# Patient Record
Sex: MALE | Race: WHITE | Employment: UNEMPLOYED | URBAN - METROPOLITAN AREA
[De-identification: names, ages, dates, MRNs, and addresses within clinical notes are randomized per-mention and may not be internally consistent; named-entity substitution may affect disease eponyms.]

---

## 2017-05-21 ENCOUNTER — HOSPITAL ENCOUNTER (EMERGENCY)
Age: 62
Discharge: HOME | End: 2017-05-21
Attending: EMERGENCY MEDICINE
Payer: MEDICARE

## 2017-05-21 VITALS
HEIGHT: 68 IN | TEMPERATURE: 97.9 F | DIASTOLIC BLOOD PRESSURE: 67 MMHG | WEIGHT: 220 LBS | SYSTOLIC BLOOD PRESSURE: 123 MMHG | OXYGEN SATURATION: 100 % | RESPIRATION RATE: 18 BRPM | HEART RATE: 68 BPM | BODY MASS INDEX: 33.34 KG/M2

## 2017-05-21 DIAGNOSIS — F20.3 UNDIFFERENTIATED SCHIZOPHRENIA (HCC): Primary | ICD-10-CM

## 2017-05-21 DIAGNOSIS — B88.8 INFESTATION BY BED BUG: ICD-10-CM

## 2017-05-21 LAB
ALBUMIN SERPL BCP-MCNC: 2.9 G/DL (ref 3.5–5)
ALBUMIN/GLOB SERPL: 0.8 {RATIO} (ref 1.1–2.2)
ALP SERPL-CCNC: 57 U/L (ref 45–117)
ALT SERPL-CCNC: 18 U/L (ref 12–78)
ANION GAP BLD CALC-SCNC: 8 MMOL/L (ref 5–15)
APAP SERPL-MCNC: 2 UG/ML (ref 10–30)
AST SERPL W P-5'-P-CCNC: 15 U/L (ref 15–37)
BASOPHILS # BLD AUTO: 0 K/UL (ref 0–0.1)
BASOPHILS # BLD: 0 % (ref 0–1)
BILIRUB SERPL-MCNC: 0.8 MG/DL (ref 0.2–1)
BUN SERPL-MCNC: 15 MG/DL (ref 6–20)
BUN/CREAT SERPL: 15 (ref 12–20)
CALCIUM SERPL-MCNC: 9.1 MG/DL (ref 8.5–10.1)
CHLORIDE SERPL-SCNC: 102 MMOL/L (ref 97–108)
CO2 SERPL-SCNC: 25 MMOL/L (ref 21–32)
CREAT SERPL-MCNC: 1.03 MG/DL (ref 0.7–1.3)
EOSINOPHIL # BLD: 0.4 K/UL (ref 0–0.4)
EOSINOPHIL NFR BLD: 6 % (ref 0–7)
ERYTHROCYTE [DISTWIDTH] IN BLOOD BY AUTOMATED COUNT: 13.7 % (ref 11.5–14.5)
ETHANOL SERPL-MCNC: <10 MG/DL
GLOBULIN SER CALC-MCNC: 3.5 G/DL (ref 2–4)
GLUCOSE SERPL-MCNC: 73 MG/DL (ref 65–100)
HCT VFR BLD AUTO: 38.9 % (ref 36.6–50.3)
HGB BLD-MCNC: 13.6 G/DL (ref 12.1–17)
LYMPHOCYTES # BLD AUTO: 34 % (ref 12–49)
LYMPHOCYTES # BLD: 2.3 K/UL (ref 0.8–3.5)
MCH RBC QN AUTO: 31.9 PG (ref 26–34)
MCHC RBC AUTO-ENTMCNC: 35 G/DL (ref 30–36.5)
MCV RBC AUTO: 91.3 FL (ref 80–99)
MONOCYTES # BLD: 0.6 K/UL (ref 0–1)
MONOCYTES NFR BLD AUTO: 9 % (ref 5–13)
NEUTS SEG # BLD: 3.4 K/UL (ref 1.8–8)
NEUTS SEG NFR BLD AUTO: 51 % (ref 32–75)
PLATELET # BLD AUTO: 132 K/UL (ref 150–400)
POTASSIUM SERPL-SCNC: 4.1 MMOL/L (ref 3.5–5.1)
PROT SERPL-MCNC: 6.4 G/DL (ref 6.4–8.2)
RBC # BLD AUTO: 4.26 M/UL (ref 4.1–5.7)
SALICYLATES SERPL-MCNC: <1.7 MG/DL (ref 2.8–20)
SODIUM SERPL-SCNC: 135 MMOL/L (ref 136–145)
WBC # BLD AUTO: 6.7 K/UL (ref 4.1–11.1)

## 2017-05-21 PROCEDURE — 80307 DRUG TEST PRSMV CHEM ANLYZR: CPT | Performed by: EMERGENCY MEDICINE

## 2017-05-21 PROCEDURE — 90791 PSYCH DIAGNOSTIC EVALUATION: CPT

## 2017-05-21 PROCEDURE — 36415 COLL VENOUS BLD VENIPUNCTURE: CPT | Performed by: EMERGENCY MEDICINE

## 2017-05-21 PROCEDURE — 85025 COMPLETE CBC W/AUTO DIFF WBC: CPT | Performed by: EMERGENCY MEDICINE

## 2017-05-21 PROCEDURE — 80053 COMPREHEN METABOLIC PANEL: CPT | Performed by: EMERGENCY MEDICINE

## 2017-05-21 PROCEDURE — 99285 EMERGENCY DEPT VISIT HI MDM: CPT

## 2017-05-21 RX ORDER — RISPERIDONE 1 MG/1
1 TABLET, ORALLY DISINTEGRATING ORAL 2 TIMES DAILY
Qty: 60 TAB | Refills: 0 | Status: ON HOLD | OUTPATIENT
Start: 2017-05-21 | End: 2017-06-29

## 2017-05-21 NOTE — ED NOTES
I have reviewed discharge instructions with the patient. The patient verbalized understanding. Provided a wheelchair on discharge. [employer] cab to home.

## 2017-05-21 NOTE — BSMART NOTE
Comprehensive Assessment Form Part 1      Section I - Disposition    Axis I - Schizoaffective  Axis II - None  Axis III -   Past Medical History:   Diagnosis Date    Hypertension     Ill-defined condition     high cholesterol    Psychiatric disorder     depression       Axis IV - None reported  [place] V - 40      The Medical Doctor to Psychiatrist conference was not completed. The Medical Doctor is in agreement with Psychiatrist disposition because of (reason) Patient does not require admission at this time. The plan is discharge to [facility]. The on-call Psychiatrist consulted was Dr. Catrina Hodgkin. The admitting Psychiatrist will be Dr. Christian Ely. The admitting Diagnosis is NA. The Payor source is Medicare and Medicaid. Section II - Integrated Summary  Summary:  Patient came in from [facility] due to a change in hallucinations. Patient hears voices at baseline but per patient 6-7 weeks ago they started kissing him all over his body. Patient reported today [facility] sent him here because he wasn't acting like himself. Patient reported he was yelling and screaming at the voices. Patient reported his mood has been \"pretty good\" and he denied appetite or sleep disturbances. Patient denied SI and HI as well. Patient reported medication compliance. Per patient he has not recently been admitted and 2014 was last admission. Patient indicated he doesn't know his psychiatrist's name and has a  named Aubree Farr but isn't sure where he works. Spoke with [facility] and they reported he was \"fussing with someone at the table, like the voices\" and the staff member had never seen that before so they sent him in. Patient reportedly sees Bonifacio Ahumada, NP and Aubree Farr at [address] is his . Current medications are Depakote 1500 mg at bed, Hydroxyzine 50 mg at bed, and Gabapentin 300 mg at bed.   Day medications are reportedly Vitamin D3, Lisinopril, Simvastatin, and Montelukast.   The patienthas not demonstrated mental capacity to provide informed consent. The information is given by the patient and past medical records. The Chief Complaint is hallucinations. The Precipitant Factors are wasn't acting himself per patient. Previous Hospitalizations: Yes  Current Psychiatrist and/or  is Patient doesn't know his psychiatrist name and reported Cosme Kaiser is his  but doesn't know where he works. .    Lethality Assessment:    The potential for suicide noted by the following: Patient denied SI. The potential for homicide is not noted. The patient has not been a perpetrator of sexual or physical abuse. There are not pending charges. The patient is not felt to be at risk for self harm or harm to others. The attending nurse was advised that security has not been notified. Section III - Psychosocial  The patient's overall mood and attitude is \"pretty good\". Feelings of helplessness and hopelessness are not observed. Generalized anxiety is not observed. Panic is not observed. Phobias are not observed. Obsessive compulsive tendencies are not observed. Section IV - Mental Status Exam  The patient's appearance shows no evidence of impairment. The patient's behavior shows no evidence of impairment. The patient is oriented to time, place, person and situation. The patient's speech shows no evidence of impairment. The patient's mood is euthymic. The range of affect shows no evidence of impairment. The patient's thought content demonstrates no evidence of impairment. The thought process shows no evidence of impairment. The patient's perception demonstrated changes in the following:  auditory  tactile. The patient's memory shows no evidence of impairment. The patient's appetite shows no evidence of impairment. The patient's sleep shows no evidence of impairment. The patient shows no insight. The patient's judgement is psychologically impaired.                   Section V - Substance Abuse  The patient is not using substances. Section VI - Living Arrangements  The patient is single. The patient lives with a caregiver. The patient has no children. The patient does plan to return home upon discharge. The patient does not have legal issues pending. The patient's source of income comes from disability. Jehovah's witness and cultural practices have not been voiced at this time. The patient's greatest support comes from [facility] and this person will be involved with the treatment. The patient has not been in an event described as horrible or outside the realm of ordinary life experience either currently or in the past.  The patient has not been a victim of sexual/physical abuse. Section VII - Other Areas of Clinical Concern  The highest grade achieved is NA with the overall quality of school experience being described as NA. The patient is currently disabled and speaks [place] as a primary language. The patient has no communication impairments affecting communication. The patient's preference for learning can be described as: can read and write adequately.   The patient's hearing is normal.  The patient's vision is normal.      Bryon Pearson, LANA

## 2017-05-21 NOTE — ED NOTES
[employer] called for transportation back to [facility]. Pt given written/verbal DC instructions by Dr. Butch Dick. Pt verbalized understanding.

## 2017-05-21 NOTE — ED NOTES
Spoke with Prerna Means from [address] [place] [address] regarding bed bugs found on Pt. Ok to Discharge pt. Awaiting Cab from [address] .

## 2017-05-21 NOTE — ED PROVIDER NOTES
HPI Comments: Merna Yin is a 62 y.o. male with PMhx significant for schizophrenia and depression who presents via EMS from [facility] to the ED with cc of auditory hallucinations x 6 weeks. He states the voices are not saying anything to him, but are kissing his legs, stomach, back and neck. EMS reports they were called by pt's sister, POA, for evaluation. He endorses taking his daily medications as instructed and has not had any recent changes in medications. Pt reports he sees a psychiatrist regularly, but notes he is unsure of her name. Pt specifically denies any suicidal ideations, visual hallucinations, cough, rhinorrhea, sore throat, abdominal pain, chest pain, and SOB. PCP: Osei Contreras MD    There are no other complaints, changes or physical findings at this time. The history is provided by the patient and the EMS personnel. Past Medical History:   Diagnosis Date    Hypertension     Ill-defined condition     high cholesterol    Psychiatric disorder     depression       No past surgical history on file. No family history on file. Social History     Social History    Marital status: SINGLE     Spouse name: N/A    Number of children: N/A    Years of education: N/A     Occupational History    Not on file. Social History Main Topics    Smoking status: Current Every Day Smoker     Packs/day: 0.25    Smokeless tobacco: Not on file    Alcohol use No    Drug use: No    Sexual activity: Not on file     Other Topics Concern    Not on file     Social History Narrative         ALLERGIES: Other medication    Review of Systems   Constitutional: Negative for chills, diaphoresis, fever and unexpected weight change. HENT: Negative for rhinorrhea and sore throat. Eyes: Negative for pain. Respiratory: Negative for cough, shortness of breath, wheezing and stridor. Cardiovascular: Negative for chest pain and leg swelling.    Gastrointestinal: Negative for abdominal pain, blood in stool, diarrhea, nausea and vomiting. Genitourinary: Negative for difficulty urinating, dysuria and flank pain. Musculoskeletal: Negative for back pain and neck stiffness. Skin: Negative for rash. Neurological: Negative for seizures, syncope, weakness, light-headedness and headaches. Psychiatric/Behavioral: Negative for confusion and suicidal ideas. + auditory hallucinations  - visual hallucinations       Patient Vitals for the past 12 hrs:   Temp Pulse Resp BP SpO2   05/21/17 1610 - 68 18 123/67 100 %   05/21/17 1445 - 73 18 113/67 100 %   05/21/17 1400 - 70 - 106/63 100 %   05/21/17 1246 - - - 100/66 98 %   05/21/17 1222 97.9 °F (36.6 °C) (!) 57 16 100/66 98 %       Physical Exam   Constitutional: He is oriented to person, place, and time. He appears well-developed and well-nourished. Disheveled   HENT:   Nose: Nose normal.   Mouth/Throat: Oropharynx is clear and moist. No oropharyngeal exudate. Poor dentition   Eyes: Conjunctivae and EOM are normal. Pupils are equal, round, and reactive to light. Right eye exhibits no discharge. Left eye exhibits no discharge. No scleral icterus. Neck: Normal range of motion. Neck supple. No JVD present. Cardiovascular: Normal rate, regular rhythm, normal heart sounds and intact distal pulses. No murmur heard. Pulmonary/Chest: Effort normal and breath sounds normal. No stridor. No respiratory distress. He has no wheezes. He has no rales. Abdominal: Soft. Bowel sounds are normal. He exhibits no distension. There is no tenderness. There is no rebound and no guarding. Musculoskeletal: He exhibits no edema or tenderness. Neurological: He is alert and oriented to person, place, and time. Skin: Skin is warm and dry. He is not diaphoretic. Excoriations covering bilateral lower extremities   Psychiatric: His speech is normal. His affect is blunt. He is actively hallucinating (auditory).  Thought content is delusional. He expresses no suicidal plans. Nursing note and vitals reviewed. 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):   Diagnosis management comments: Schizophrenic pt with hallucinations of voices \"tickling and kissing\" him. Pt incidentally found to have bed bugs. Query whether these are the cause of pt's hallucinations. For some reason, pt found not to be on antipsychotics at this time. Psychiatry consulted and recommended restarting Risperidone. Stable for discharge. Amount and/or Complexity of Data Reviewed  Clinical lab tests: ordered and reviewed  Obtain history from someone other than the patient: yes (EMS)  Review and summarize past medical records: yes  Discuss the patient with other providers: yes ([address]  Psychiatry)    Patient Progress  Patient progress: stable    ED Course       Procedures    CONSULT NOTE:   12:41 PM  Jazzy Young MD spoke with Joe Meyer,   Specialty: [address]  Discussed pt's hx, disposition, and available diagnostic and imaging results. Reviewed care plans. Consultant agrees with plans as outlined. Joe Meyer will come to evaluate pt. Written by Rob Antonio ED Scribe, as dictated by Jazzy Young MD.    CONSULT NOTE:   2:47 PM  Jazzy Young MD spoke with Porsche Enriquez MD,   Specialty: psychiatry  Discussed pt's hx, disposition, and available diagnostic and imaging results. Reviewed care plans. Consultant agrees with plans as outlined. Dr. Ana María Dunbar recommended pt be started on Risperidone 1mg BID and to follow up closely with his mental health provider.    Written by Rob Antonio ED Scribe, as dictated by Jazzy Young MD.    LABORATORY TESTS:  Recent Results (from the past 12 hour(s))   CBC WITH AUTOMATED DIFF    Collection Time: 05/21/17  1:37 PM   Result Value Ref Range    WBC 6.7 4.1 - 11.1 K/uL    RBC 4.26 4.10 - 5.70 M/uL    HGB 13.6 12.1 - 17.0 g/dL    HCT 38.9 36.6 - 50.3 %    MCV 91.3 80.0 - 99.0 FL    MCH 31.9 26.0 - 34.0 PG    MCHC 35.0 30.0 - 36.5 g/dL    RDW 13.7 11.5 - 14.5 %    PLATELET 223 (L) 349 - 400 K/uL    NEUTROPHILS 51 32 - 75 %    LYMPHOCYTES 34 12 - 49 %    MONOCYTES 9 5 - 13 %    EOSINOPHILS 6 0 - 7 %    BASOPHILS 0 0 - 1 %    ABS. NEUTROPHILS 3.4 1.8 - 8.0 K/UL    ABS. LYMPHOCYTES 2.3 0.8 - 3.5 K/UL    ABS. MONOCYTES 0.6 0.0 - 1.0 K/UL    ABS. EOSINOPHILS 0.4 0.0 - 0.4 K/UL    ABS. BASOPHILS 0.0 0.0 - 0.1 K/UL   METABOLIC PANEL, COMPREHENSIVE    Collection Time: 05/21/17  1:37 PM   Result Value Ref Range    Sodium 135 (L) 136 - 145 mmol/L    Potassium 4.1 3.5 - 5.1 mmol/L    Chloride 102 97 - 108 mmol/L    CO2 25 21 - 32 mmol/L    Anion gap 8 5 - 15 mmol/L    Glucose 73 65 - 100 mg/dL    BUN 15 6 - 20 MG/DL    Creatinine 1.03 0.70 - 1.30 MG/DL    BUN/Creatinine ratio 15 12 - 20      GFR est AA >60 >60 ml/min/1.73m2    GFR est non-AA >60 >60 ml/min/1.73m2    Calcium 9.1 8.5 - 10.1 MG/DL    Bilirubin, total 0.8 0.2 - 1.0 MG/DL    ALT (SGPT) 18 12 - 78 U/L    AST (SGOT) 15 15 - 37 U/L    Alk. phosphatase 57 45 - 117 U/L    Protein, total 6.4 6.4 - 8.2 g/dL    Albumin 2.9 (L) 3.5 - 5.0 g/dL    Globulin 3.5 2.0 - 4.0 g/dL    A-G Ratio 0.8 (L) 1.1 - 2.2     ETHYL ALCOHOL    Collection Time: 05/21/17  1:37 PM   Result Value Ref Range    ALCOHOL(ETHYL),SERUM <10 <10 MG/DL   ACETAMINOPHEN    Collection Time: 05/21/17  1:37 PM   Result Value Ref Range    ACETAMINOPHEN 2 (L) 10 - 30 ug/mL   SALICYLATE    Collection Time: 05/21/17  1:37 PM   Result Value Ref Range    SALICYLATE <1.6 (L) 2.8 - 20.0 MG/DL       IMPRESSION:  1. Undifferentiated schizophrenia ([address]    2. Infestation by bed bug        PLAN:  1. Discharge home  Current Discharge Medication List      START taking these medications    Details   risperiDONE (RISPERDAL M-TABS) 1 mg disintegrating tablet Take 1 Tab by mouth two (2) times a day.   Qty: 60 Tab, Refills: 0         CONTINUE these medications which have NOT CHANGED    Details divalproex DR (DEPAKOTE) 500 mg tablet Take 1,500 mg by mouth nightly. triamcinolone acetonide (KENALOG) 0.1 % topical cream Apply  to affected area two (2) times a day. use thin layer  Qty: 15 g, Refills: 0      lisinopril (PRINIVIL, ZESTRIL) 10 mg tablet Take 10 mg by mouth daily. amLODIPine (NORVASC) 5 mg tablet Take 5 mg by mouth daily. simvastatin (ZOCOR) 10 mg tablet Take 10 mg by mouth nightly.      gabapentin (NEURONTIN) 300 mg capsule Take 300 mg by mouth three (3) times daily. risperiDONE (RISPERDAL) 1 mg tablet Take 1 mg by mouth nightly. permethrin (ACTICIN) 5 % topical cream Apply cream from head (avoid mouth/nose) to soles of feet and wash after 8-14 hours. May repeat therapy in 7 days. Qty: 60 g, Refills: 1           2. Follow-up Information     Follow up With Details Comments Contact Info    Primary Care Doctor Call As needed     [place] Provider Call in 1 day          Return to ED if worse     DISCHARGE NOTE:  2:53 PM  The patient is ready for discharge. The patients signs, symptoms, diagnosis, and instructions for discharge have been discussed and the pt has conveyed their understanding. The patient is to follow up as recommended with PCP or return to the ER should their symptoms worsen. Plan has been discussed and patient has conveyed their agreement. This note is prepared by Darrian Ron, acting as Scribe for Gerhardt Fang, MD.    Gerhardt Fang, MD: The scribe's documentation has been prepared under my direction and personally reviewed by me in its entirety. I confirm that the note above accurately reflects all work, treatment, procedures, and medical decision making performed by me.

## 2017-05-21 NOTE — DISCHARGE INSTRUCTIONS
Schizophrenia: Care Instructions  Your Care Instructions  Schizophrenia is a disease that makes it hard to think clearly, manage emotions, and interact with other people. It can cause:  · Delusions. These are beliefs that are not real.  · Hallucinations. These are things that you may see or hear that are not really there. · Paranoia. This is the belief that others are lying, cheating, using you, or trying to harm you. The disease may change your ability to enjoy life, express emotions, or function. At times, you may hear voices, behave strangely, have trouble speaking or understanding speech, or keep to yourself. The goal of treatment is to lower your stress and help your brain function normally. You may need lifelong treatment with medicines and counseling to keep your schizophrenia under control. When schizophrenia is not treated, the risks are higher for suicide, a hospital stay, and other problems. Early treatment called coordinated specialty care [facility]) may help a person who is having his or her first episode of psychotic thoughts. Ask your doctor about [address]. Follow-up care is a key part of your treatment and safety. Be sure to make and go to all appointments, and call your doctor if you are having problems. It's also a good idea to know your test results and keep a list of the medicines you take. How can you care for yourself at home? · Be safe with medicines. Take your medicines exactly as prescribed. Call your doctor if you think you are having a problem with your medicine. When you feel good, you may think that you do not need your medicines. But it is important to keep taking them as scheduled. · Go to your counseling sessions. Call and talk with your counselor if you can't attend or if you don't think the sessions are helping. Do not just stop going. · Eat a healthy diet. Talk with a dietitian about what type of diet may be best for you. · Do not use alcohol or illegal drugs.   What should you do if someone in your family has schizophrenia? · Learn about the disease and how it may get worse over time. · Remind your family member that you love him or her. · Make a plan with all family members about how to take care of your loved one when his or her symptoms are bad. · Talk about your fears and concerns and those of other family members. Seek counseling if needed. · Do not focus attention only on the person who is in treatment. · Remind yourself that it will take time for changes to occur. · Do not blame yourself for the disease. · Know your legal rights and the legal rights of your family member. · Take care of yourself. Stay involved with your own interests, such as your career, hobbies, and friends. Use exercise, positive self-talk, relaxation, and deep breathing to help lower your stress. · Give yourself time to grieve. You may need to deal with emotions such as anger, fear, and frustration. After you work through your feelings, you will be better able to care for yourself and your family. · If you are having a hard time with your feelings and your interactions with your family member, talk with a counselor. When should you call for help? Call 911 anytime you think you may need emergency care. For example, call if:  · You are thinking about suicide or are threatening suicide. · You feel like hurting yourself or someone else. Call your doctor now or seek immediate medical care if:  · You hear voices. · You think someone is trying to harm you. · You cannot concentrate or are easily confused. Watch closely for changes in your health, and be sure to contact your doctor if:  · You are having trouble taking care of yourself. · You cannot attend your counseling sessions. Where can you learn more? Go to http://yonathan-roger.info/. Enter H395 in the search box to learn more about \"Schizophrenia: Care Instructions. \"  Current as of: July 26, 2016  Content Version: 11.2  © 3979-9861 [employer]. Care instructions adapted under license by [employer] (which disclaims liability or warranty for this information). If you have questions about a medical condition or this instruction, always ask your healthcare professional. [place] [address] any warranty or liability for your use of this information.

## 2017-05-21 NOTE — ED TRIAGE NOTES
Pt reports he hears voices and lately they have been \"tickling or kissing him on his feet and legs. \" EMS was called by POA pt's sister. Denies pain and discomfort, denies SOB no signs of acute distress. Denies thoughts of suicide, and denies thoughts of hurting or harming him self or others.

## 2017-05-21 NOTE — ED NOTES
obtained blood work found two bed bugs on pt's pants. Removed and double bagged clothing, CHG wiped pt from head to toe and provided education. Unable to provide urine sample at this time.

## 2017-06-10 ENCOUNTER — HOSPITAL ENCOUNTER (EMERGENCY)
Age: 62
Discharge: HOME | End: 2017-06-10
Attending: EMERGENCY MEDICINE
Payer: MEDICARE

## 2017-06-10 ENCOUNTER — APPOINTMENT (OUTPATIENT)
Dept: CT IMAGING | Age: 62
End: 2017-06-10
Attending: EMERGENCY MEDICINE
Payer: MEDICARE

## 2017-06-10 VITALS
BODY MASS INDEX: 33.13 KG/M2 | OXYGEN SATURATION: 100 % | HEIGHT: 68 IN | SYSTOLIC BLOOD PRESSURE: 105 MMHG | DIASTOLIC BLOOD PRESSURE: 60 MMHG | WEIGHT: 218.6 LBS | HEART RATE: 68 BPM | TEMPERATURE: 97.7 F | RESPIRATION RATE: 16 BRPM

## 2017-06-10 DIAGNOSIS — F20.9 SCHIZOPHRENIA, UNSPECIFIED TYPE (HCC): ICD-10-CM

## 2017-06-10 DIAGNOSIS — S09.90XA CHI (CLOSED HEAD INJURY), INITIAL ENCOUNTER: Primary | ICD-10-CM

## 2017-06-10 LAB
ALBUMIN SERPL BCP-MCNC: 2.8 G/DL (ref 3.5–5)
ALBUMIN/GLOB SERPL: 0.8 {RATIO} (ref 1.1–2.2)
ALP SERPL-CCNC: 55 U/L (ref 45–117)
ALT SERPL-CCNC: 17 U/L (ref 12–78)
ANION GAP BLD CALC-SCNC: 5 MMOL/L (ref 5–15)
AST SERPL W P-5'-P-CCNC: 14 U/L (ref 15–37)
BASOPHILS # BLD AUTO: 0 K/UL (ref 0–0.1)
BASOPHILS # BLD: 0 % (ref 0–1)
BILIRUB SERPL-MCNC: 0.5 MG/DL (ref 0.2–1)
BUN SERPL-MCNC: 11 MG/DL (ref 6–20)
BUN/CREAT SERPL: 12 (ref 12–20)
CALCIUM SERPL-MCNC: 9 MG/DL (ref 8.5–10.1)
CHLORIDE SERPL-SCNC: 107 MMOL/L (ref 97–108)
CK SERPL-CCNC: 56 U/L (ref 39–308)
CO2 SERPL-SCNC: 24 MMOL/L (ref 21–32)
CREAT SERPL-MCNC: 0.9 MG/DL (ref 0.7–1.3)
EOSINOPHIL # BLD: 0.5 K/UL (ref 0–0.4)
EOSINOPHIL NFR BLD: 8 % (ref 0–7)
ERYTHROCYTE [DISTWIDTH] IN BLOOD BY AUTOMATED COUNT: 14 % (ref 11.5–14.5)
GLOBULIN SER CALC-MCNC: 3.6 G/DL (ref 2–4)
GLUCOSE SERPL-MCNC: 88 MG/DL (ref 65–100)
HCT VFR BLD AUTO: 38.2 % (ref 36.6–50.3)
HGB BLD-MCNC: 13.5 G/DL (ref 12.1–17)
LYMPHOCYTES # BLD AUTO: 27 % (ref 12–49)
LYMPHOCYTES # BLD: 1.8 K/UL (ref 0.8–3.5)
MCH RBC QN AUTO: 32.4 PG (ref 26–34)
MCHC RBC AUTO-ENTMCNC: 35.3 G/DL (ref 30–36.5)
MCV RBC AUTO: 91.6 FL (ref 80–99)
MONOCYTES # BLD: 0.7 K/UL (ref 0–1)
MONOCYTES NFR BLD AUTO: 11 % (ref 5–13)
NEUTS SEG # BLD: 3.7 K/UL (ref 1.8–8)
NEUTS SEG NFR BLD AUTO: 54 % (ref 32–75)
PLATELET # BLD AUTO: 152 K/UL (ref 150–400)
POTASSIUM SERPL-SCNC: 4.1 MMOL/L (ref 3.5–5.1)
PROT SERPL-MCNC: 6.4 G/DL (ref 6.4–8.2)
RBC # BLD AUTO: 4.17 M/UL (ref 4.1–5.7)
SODIUM SERPL-SCNC: 136 MMOL/L (ref 136–145)
TROPONIN I SERPL-MCNC: <0.04 NG/ML
WBC # BLD AUTO: 6.8 K/UL (ref 4.1–11.1)

## 2017-06-10 PROCEDURE — 93005 ELECTROCARDIOGRAM TRACING: CPT

## 2017-06-10 PROCEDURE — 82550 ASSAY OF CK (CPK): CPT | Performed by: EMERGENCY MEDICINE

## 2017-06-10 PROCEDURE — 84484 ASSAY OF TROPONIN QUANT: CPT | Performed by: EMERGENCY MEDICINE

## 2017-06-10 PROCEDURE — 85025 COMPLETE CBC W/AUTO DIFF WBC: CPT | Performed by: EMERGENCY MEDICINE

## 2017-06-10 PROCEDURE — 99284 EMERGENCY DEPT VISIT MOD MDM: CPT

## 2017-06-10 PROCEDURE — 70450 CT HEAD/BRAIN W/O DYE: CPT

## 2017-06-10 PROCEDURE — 36415 COLL VENOUS BLD VENIPUNCTURE: CPT | Performed by: EMERGENCY MEDICINE

## 2017-06-10 PROCEDURE — 80053 COMPREHEN METABOLIC PANEL: CPT | Performed by: EMERGENCY MEDICINE

## 2017-06-10 NOTE — ED PROVIDER NOTES
HPI Comments: Karina Whitney is a 65 y.o. male with PMHx significant for hypercholesteremia and depression presenting via EMS to [facility] ED from [facility] for evaluation s/p hitting his head on couch about 1.5 hours PTA. The pt states that he was attempting to stand up when all of a sudden he struck his head on a couch. He notes that prior to hitting his head on the couch he experienced some dizziness but denies experiencing any now. Per EMS the pt has additional sx of generalized weakness x 5 days. They note that the pt has also been more agitated than usual lately. The pt denies chest pain, LOC or any trauma to his neck. PCP: Osei Contreras MD  Social History:  (+) Tobacco (0.25 ppd),   (-) EtOH,      (-) Drugs     There are no other complaints, changes, or physical findings at this time. The history is provided by the patient. Past Medical History:   Diagnosis Date    Hypertension     Ill-defined condition     high cholesterol    Psychiatric disorder     depression       No past surgical history on file. No family history on file. Social History     Social History    Marital status: SINGLE     Spouse name: N/A    Number of children: N/A    Years of education: N/A     Occupational History    Not on file. Social History Main Topics    Smoking status: Current Every Day Smoker     Packs/day: 0.25    Smokeless tobacco: Not on file    Alcohol use No    Drug use: No    Sexual activity: Not on file     Other Topics Concern    Not on file     Social History Narrative         ALLERGIES: Other medication    Review of Systems   Constitutional: Negative for fatigue and fever. HENT: Negative for congestion, ear pain and rhinorrhea. Eyes: Negative for pain and redness. Respiratory: Negative for cough and wheezing. Cardiovascular: Negative for chest pain and palpitations. Gastrointestinal: Negative for abdominal pain, nausea and vomiting.    Genitourinary: Negative for dysuria, frequency and urgency. Musculoskeletal: Negative for back pain, neck pain and neck stiffness. Skin: Negative for rash and wound. Neurological: Positive for dizziness, weakness and headaches. Negative for light-headedness and numbness. All other systems reviewed and are negative.       Vitals:    06/10/17 1456 06/10/17 1500 06/10/17 1515 06/10/17 1711   BP: 98/46 98/46 105/60    Pulse: 60   68   Resp: 16      Temp: 97.7 °F (36.5 °C)      SpO2: 98%  99% 100%   Weight: 99.2 kg (218 lb 9.6 oz)      Height: 5' 8\" (1.727 m)               Physical Exam        MDM  Number of Diagnoses or Management Options  CHI (closed head injury), initial encounter:   Schizophrenia, unspecified type [facility]):   Diagnosis management comments:   DDx: ICH, contusion, electrolyte abnormality        Amount and/or Complexity of Data Reviewed  Clinical lab tests: ordered and reviewed  Tests in the radiology section of CPT®: ordered and reviewed  Tests in the medicine section of CPT®: reviewed and ordered  Review and summarize past medical records: yes  Independent visualization of images, tracings, or specimens: yes    Patient Progress  Patient progress: stable    ED Course       Procedures    Physical Examination: General appearance - WDWN, in no apparent distress  Head - NC/AT  Eyes - pupils equal, round  and reactive, extraocular eye movements intact, conj/sclera clear, anicteric  Mouth - mucous membranes moist, pharynx normal without lesions  Nose/Ears - nares clear, Tms & canals clear  Neck - supple, no significant adenopathy, trachea midline, no crepitus, c spine diffusely non-tender, no step offs  Chest - Normal respiratory effort, clear to auscultation bilaterally, no wheezes/rales/rhonchi  Heart - normal rate and regular rhythm, S1 and S2 normal, no murmurs, gallops, or rubs  Abdomen - soft, nontender, nondistended, nabs, no masses, guarding, rebound or rigidity  Neurological - alert, oriented, normal speech, cranial nerves intact, no focal motor findings, motor & sensory diffusely intact, normal gait  Extremities/MS - peripheral pulses normal, no pedal edema, all joints atraumatic, FROM, non-tender, no gross deformities, spine diffusely non-tender  Skin - normal turgor, fine red rash to the extremities , no lesions or lacerations    5:49 PM  Pt urinated all over the floor. Per the RN the urine was clear and not malodorous. 5:50 PM  Deidra Tierney final results have been reviewed with him. He has been counseled regarding his diagnosis. He verbally conveys understanding and agreement of the signs, symptoms, diagnosis, treatment and prognosis . LABORATORY TESTS:  @  Patient Vitals for the past 12 hrs:   Temp Pulse Resp BP SpO2   06/10/17 1711 - 68 - - 100 %   06/10/17 1515 - - - 105/60 99 %   06/10/17 1500 - - - 98/46 -   06/10/17 1456 97.7 °F (36.5 °C) 60 16 98/46 98 %   @    IMAGING RESULTS:  CT HEAD WO CONT   Final Result      EXAM: CT HEAD WO CONT     INDICATION: head trauma, weakness     COMPARISON: None.     TECHNIQUE: Unenhanced CT of the head was performed using 5 mm images. Brain and  bone windows were generated. CT dose reduction was achieved through use of a  standardized protocol tailored for this examination and automatic exposure  control for dose modulation.      FINDINGS:  There is prominence of the ventricles and cortical sulci, consistent with  cerebral volume loss. CSF spaces are slightly more prominent on the left. . There  is no significant white matter disease. There is no intracranial hemorrhage,  extra-axial collection, mass, mass effect or midline shift. The basilar  cisterns are open. No acute infarct is identified. The bone windows demonstrate  no abnormalities. The visualized portions of the paranasal sinuses and mastoid  air cells are clear.     IMPRESSION  IMPRESSION: No acute findings. Cerebral volume loss. MEDICATIONS GIVEN:  Medications - No data to display    IMPRESSION:  1.  CHI (closed head injury), initial encounter    2. Schizophrenia, unspecified type ([facility] [address]        PLAN:  1. Current Discharge Medication List        2. Follow-up Information     Follow up With Details Comments [address], [place] Schedule an appointment as soon as possible for a visit As needed [facility]   MOB [address] [facility]  [phone]      [facility] EMERGENCY DEPT  As needed, If symptoms worsen [address] [place] [address]  [phone]        Return to ED if worse     DISCHARGE NOTE  6:40 PM  The patient has been re-evaluated and is ready for discharge. Reviewed available results with patient. Counseled pt on diagnosis and care plan. Pt has expressed understanding, and all questions have been answered. Pt agrees with plan and agrees to F/U as recommended, or return to the ED if their sxs worsen. Discharge instructions have been provided and explained to the pt, along with reasons to return to the ED. This note is prepared by Michelle Miller, acting as Scribe for [employer]. Kelsie White, [address] [place] [address]. Kelsie White MD: The scribe's documentation has been prepared under my direction and personally reviewed by me in its entirety. I confirm that the note above accurately reflects all work, treatment, procedures, and medical decision making performed by me.

## 2017-06-10 NOTE — DISCHARGE INSTRUCTIONS
Learning About a Closed Head Injury  What is a closed head injury? A closed head injury happens when your head gets hit hard. The strong force of the blow causes your brain to shake in your skull. This movement can cause the brain to bruise, swell, or tear. Sometimes nerves or blood vessels also get damaged. This can cause bleeding in or around the brain. A concussion is a type of closed head injury. What are the symptoms? If you have a mild concussion, you may have a mild headache or feel \"not quite right. \" These symptoms are common. They usually go away over a few days to 4 weeks. But sometimes after a concussion, you feel like you can't function as well as before the injury. And you have new symptoms. This is called postconcussive syndrome. You may:  · Find it harder to solve problems, think, concentrate, or remember. · Have headaches. · Have changes in your sleep patterns, such as not being able to sleep or sleeping all the time. · Have changes in your personality. · Not be interested in your usual activities. · Feel angry or anxious without a clear reason. · Lose your sense of taste or smell. · Be dizzy, lightheaded, or unsteady. It may be hard to stand or walk. How is a closed head injury treated? Any person who may have a concussion needs to see a doctor. Some people have to stay in the hospital to be watched. Others can go home safely. If you go home, follow your doctor's instructions. He or she will tell you if you need someone to watch you closely for the next 24 hours or longer. Rest is the best treatment. Get plenty of sleep at night. And try to rest during the day. · Avoid activities that are physically or mentally demanding. These include housework, exercise, and schoolwork. And don't play video games, send text messages, or use the computer. You may need to change your school or work schedule to be able to avoid these activities.   · Ask your doctor when it's okay to drive, ride a bike, or operate machinery. · Take an over-the-counter pain medicine, such as acetaminophen (Tylenol), ibuprofen (Advil, Motrin), or naproxen (Aleve). Be safe with medicines. Read and follow all instructions on the label. · Check with your doctor before you use any other medicines for pain. · Do not drink alcohol or use illegal drugs. They can slow recovery. They can also increase your risk of getting a second head injury. Follow-up care is a key part of your treatment and safety. Be sure to make and go to all appointments, and call your doctor if you are having problems. It's also a good idea to know your test results and keep a list of the medicines you take. Where can you learn more? Go to http://yonathan-roger.info/. Enter E235 in the search box to learn more about \"Learning About a Closed Head Injury. \"  Current as of: October 14, 2016  Content Version: 11.2  © 8963-0069 [employer]. Care instructions adapted under license by [employer] (which disclaims liability or warranty for this information). If you have questions about a medical condition or this instruction, always ask your healthcare professional. [place] [address] any warranty or liability for your use of this information. Schizophrenia: Care Instructions  Your Care Instructions  Schizophrenia is a disease that makes it hard to think clearly, manage emotions, and interact with other people. It can cause:  · Delusions. These are beliefs that are not real.  · Hallucinations. These are things that you may see or hear that are not really there. · Paranoia. This is the belief that others are lying, cheating, using you, or trying to harm you. The disease may change your ability to enjoy life, express emotions, or function. At times, you may hear voices, behave strangely, have trouble speaking or understanding speech, or keep to yourself.   The goal of treatment is to lower your stress and help your brain function normally. You may need lifelong treatment with medicines and counseling to keep your schizophrenia under control. When schizophrenia is not treated, the risks are higher for suicide, a hospital stay, and other problems. Early treatment called coordinated specialty care [facility]) may help a person who is having his or her first episode of psychotic thoughts. Ask your doctor about [address]. Follow-up care is a key part of your treatment and safety. Be sure to make and go to all appointments, and call your doctor if you are having problems. It's also a good idea to know your test results and keep a list of the medicines you take. How can you care for yourself at home? · Be safe with medicines. Take your medicines exactly as prescribed. Call your doctor if you think you are having a problem with your medicine. When you feel good, you may think that you do not need your medicines. But it is important to keep taking them as scheduled. · Go to your counseling sessions. Call and talk with your counselor if you can't attend or if you don't think the sessions are helping. Do not just stop going. · Eat a healthy diet. Talk with a dietitian about what type of diet may be best for you. · Do not use alcohol or illegal drugs. What should you do if someone in your family has schizophrenia? · Learn about the disease and how it may get worse over time. · Remind your family member that you love him or her. · Make a plan with all family members about how to take care of your loved one when his or her symptoms are bad. · Talk about your fears and concerns and those of other family members. Seek counseling if needed. · Do not focus attention only on the person who is in treatment. · Remind yourself that it will take time for changes to occur. · Do not blame yourself for the disease. · Know your legal rights and the legal rights of your family member. · Take care of yourself.  Stay involved with your own interests, such as your career, hobbies, and friends. Use exercise, positive self-talk, relaxation, and deep breathing to help lower your stress. · Give yourself time to grieve. You may need to deal with emotions such as anger, fear, and frustration. After you work through your feelings, you will be better able to care for yourself and your family. · If you are having a hard time with your feelings and your interactions with your family member, talk with a counselor. When should you call for help? Call 911 anytime you think you may need emergency care. For example, call if:  · You are thinking about suicide or are threatening suicide. · You feel like hurting yourself or someone else. Call your doctor now or seek immediate medical care if:  · You hear voices. · You think someone is trying to harm you. · You cannot concentrate or are easily confused. Watch closely for changes in your health, and be sure to contact your doctor if:  · You are having trouble taking care of yourself. · You cannot attend your counseling sessions. Where can you learn more? Go to http://yonathan-roger.info/. Enter Q719 in the search box to learn more about \"Schizophrenia: Care Instructions. \"  Current as of: July 26, 2016  Content Version: 11.2  © 7088-5278 [employer]. Care instructions adapted under license by [employer] (which disclaims liability or warranty for this information). If you have questions about a medical condition or this instruction, always ask your healthcare professional. [address] any warranty or liability for your use of this information.

## 2017-06-10 NOTE — ED NOTES
Called [facility] again for an ETA, at this time they are still searching for transport. They will call back with an ETA once transport is found.

## 2017-06-11 LAB
ATRIAL RATE: 45 BPM
CALCULATED P AXIS, ECG09: 45 DEGREES
CALCULATED R AXIS, ECG10: -46 DEGREES
CALCULATED T AXIS, ECG11: 16 DEGREES
DIAGNOSIS, 93000: NORMAL
P-R INTERVAL, ECG05: 210 MS
Q-T INTERVAL, ECG07: 446 MS
QRS DURATION, ECG06: 160 MS
QTC CALCULATION (BEZET), ECG08: 385 MS
VENTRICULAR RATE, ECG03: 45 BPM

## 2017-06-11 NOTE — ED NOTES
The physician has reviewed discharge instructions with the patient. The patient verbalized understanding. Patient ambulated out of the Emergency Department to [facility] for logisticare transport home.

## 2017-06-11 NOTE — ED NOTES
Called [facility] once more for an ETA, they stated that they will have a taxi pick the patient up, they just signed up for the patient. The paperwork will take 20-30 minutes and then add on time depending on how far the taxi is away from this facility. They were not able to given an exact ETA.

## 2017-06-28 ENCOUNTER — HOSPITAL ENCOUNTER (INPATIENT)
Age: 62
LOS: 1 days | Discharge: HOME | DRG: 885 | End: 2017-06-30
Attending: EMERGENCY MEDICINE | Admitting: PSYCHIATRY & NEUROLOGY
Payer: MEDICARE

## 2017-06-28 DIAGNOSIS — R44.3 HALLUCINATION: Primary | ICD-10-CM

## 2017-06-28 PROCEDURE — 99284 EMERGENCY DEPT VISIT MOD MDM: CPT

## 2017-06-28 PROCEDURE — 90791 PSYCH DIAGNOSTIC EVALUATION: CPT

## 2017-06-28 RX ORDER — MONTELUKAST SODIUM 10 MG/1
10 TABLET ORAL DAILY
COMMUNITY

## 2017-06-28 RX ORDER — CHOLECALCIFEROL (VITAMIN D3) 125 MCG
2000 CAPSULE ORAL DAILY
COMMUNITY

## 2017-06-28 RX ORDER — FLUTICASONE PROPIONATE 50 MCG
2 SPRAY, SUSPENSION (ML) NASAL DAILY
COMMUNITY

## 2017-06-28 RX ORDER — PALIPERIDONE 3 MG/1
3 TABLET, EXTENDED RELEASE ORAL
Status: ON HOLD | COMMUNITY
End: 2017-06-29

## 2017-06-28 RX ORDER — ALBUTEROL SULFATE 90 UG/1
2 AEROSOL, METERED RESPIRATORY (INHALATION)
COMMUNITY

## 2017-06-28 RX ORDER — NEOMYCIN SULFATE, POLYMYXIN B SULFATE AND HYDROCORTISONE 10; 3.5; 1 MG/ML; MG/ML; [USP'U]/ML
3 SUSPENSION/ DROPS AURICULAR (OTIC) 4 TIMES DAILY
COMMUNITY

## 2017-06-28 NOTE — IP AVS SNAPSHOT
Summary of Care Report The Summary of Care report has been created to help improve care coordination. Users with access to [employer] or [address] (Web-based application) may access additional patient information including the Discharge Summary. If you are not currently a [address] user and need more information, please call the number listed below in the [address] section and ask to be connected with Medical Records. Facility Information Name Address Phone [address] [facility] [address] [phone] Patient Information Patient Name Sex MANJINDER Phillips ([id number]) Male 1955 Discharge Information Admitting Provider Service Area Unit Nava Bender MD / [id number] / [phone] Discharge Provider Discharge Date/Time Discharge Disposition Destination (none) 2017 (Pending) AHR (none) Patient Language Language ENGLISH [13] Hospital Problems as of 2017  Never Reviewed Class Noted - Resolved Last Modified POA Active Problems Schizophrenia ([facility] [address]  2017 - Present 2017 by Tova Roblero MD Yes Entered by Nava Bender MD  
  
You are allergic to the following Allergen Reactions Other Medication Anxiety \"all antidepressants\" Current Discharge Medication List  
  
CONTINUE these medications which have NOT CHANGED Dose & Instructions Dispensing Information Comments DEPAKOTE  mg ER tablet Generic drug:  divalproex ER Dose:  1500 mg Take 1,500 mg by mouth nightly. Indications: MOOD STABILIZATION Refills:  0  
   
 FLONASE 50 mcg/actuation nasal spray Generic drug:  fluticasone Dose:  2 Spray 2 Sprays by Both Nostrils route daily. Indications: ALLERGIC RHINITIS  Refills:  0  
   
 hydrOXYzine pamoate 50 mg capsule Commonly known as:  VISTARIL Dose:  25 mg Take 25 mg by mouth daily as needed for Anxiety. Indications: URTICARIA Refills:  0  
   
 lisinopril 10 mg tablet Commonly known as:  Helon Estrin Dose:  10 mg Take 10 mg by mouth daily. Indications: hypertension Refills:  0  
   
 montelukast 10 mg tablet Commonly known as:  SINGULAIR Dose:  10 mg Take 10 mg by mouth daily. Indications: ALLERGIC RHINITIS Refills:  0  
   
 neomycin-polymyxin-hydrocortisone (buffered) 3.5-10,000-1 mg/mL-unit/mL-% otic suspension Commonly known as:  Corinne Mourning Dose:  3 Drop Administer 3 Drops in right ear four (4) times daily. Refills:  0 NEURONTIN 300 mg capsule Generic drug:  gabapentin Dose:  300 mg Take 300 mg by mouth nightly. Refills:  0  
   
 paliperidone 6 mg SR tablet Commonly known as:  INVEGA Dose:  6 mg Take 6 mg by mouth daily. Refills:  0 Senna 8.6 mg Cap Generic drug:  sennosides Dose:  2 Tab Take 2 Tabs by mouth nightly as needed (Constipation). Refills:  0  
   
 simvastatin 20 mg tablet Commonly known as:  ZOCOR Dose:  20 mg Take 20 mg by mouth nightly. Indications: hypercholesterolemia Refills:  0  
   
 triamcinolone acetonide 0.1 % topical cream  
Commonly known as:  KENALOG Apply  to affected area two (2) times a day. use thin layer Quantity:  15 g Refills:  0 VENTOLIN HFA 90 mcg/actuation inhaler Generic drug:  albuterol Dose:  2 Puff Take 2 Puffs by inhalation every six (6) hours as needed for Wheezing or Shortness of Breath. Refills:  0  
   
 VITAMIN D3 2,000 unit Tab Generic drug:  cholecalciferol (vitamin D3) Dose:  2000 Units Take 2,000 Units by mouth daily. Indications: PREVENTION OF VITAMIN D DEFICIENCY Refills:  0 Follow-up Information Follow up With Details Comments Contact Info  Phys Other, MD   Patient can only remember the practice name and not the physician Dr. Martita Jonas at the Adult home   you will be seen at the adult home Discharge Instructions DISCHARGE SUMMARY 
 
NAME:Denzel Marmolejo : 1955 MRN: [id number] The patient Noelle Medley exhibits the ability to control behavior in a less restrictive environment. Patient's level of functioning is improving. No assaultive/destructive behavior has been observed for the past 24 hours. No suicidal/homicidal threat or behavior has been observed for the past 24 hours. There is no evidence of serious medication side effects. Patient has not been in physical or protective restraints for at least the past 24 hours. If weapons involved, how are they secured? No weapons involved Is patient aware of and in agreement with discharge plan? Patient and niece are aware of discharge . Arrangements for medication: no prescriptions given to patient. No changes in medications. Copy of discharge instructions to  provider?:  Yes, given to adult home staff Arrangements for transportation home:  Adult home staff to  Keep all follow up appointments as scheduled, continue to take prescribed medications per physician instructions. Mental health crisis number:  [phone] or your local mental health crisis line number at [phone]. DISCHARGE SUMMARY from Nurse The following personal items are in your possession at time of discharge: 
 
Dental Appliances: None Visual Aid: None Home Medications: None Jewelry: None Clothing: Footwear, Pants, Shirt, Socks, Undergarments (locked in hallway closet) Other Valuables: None PATIENT INSTRUCTIONS: 
 
What to do at Home: 
Recommended activity: Activity as tolerated. If you experience any of the following symptoms overwhelming thoughts of harming self or other, unmanageable hyperactivity or depression, please follow up with 911 or your local mental health crisis line number at [phone] .  
 
 
*  Please give a list of your current medications to your Primary Care Provider. *  Please update this list whenever your medications are discontinued, doses are 
    changed, or new medications (including over-the-counter products) are added. *  Please carry medication information at all times in case of emergency situations. These are general instructions for a healthy lifestyle: No smoking/ No tobacco products/ Avoid exposure to second hand smoke Surgeon General's Warning:  Quitting smoking now greatly reduces serious risk to your health. Obesity, smoking, and sedentary lifestyle greatly increases your risk for illness A healthy diet, regular physical exercise & weight monitoring are important for maintaining a healthy lifestyle You may be retaining fluid if you have a history of heart failure or if you experience any of the following symptoms:  Weight gain of 3 pounds or more overnight or 5 pounds in a week, increased swelling in our hands or feet or shortness of breath while lying flat in bed. Please call your doctor as soon as you notice any of these symptoms; do not wait until your next office visit. Recognize signs and symptoms of STROKE: 
 
F-face looks uneven A-arms unable to move or move unevenly S-speech slurred or non-existent T-time-call 911 as soon as signs and symptoms begin-DO NOT go Back to bed or wait to see if you get better-TIME IS BRAIN. Warning Signs of HEART ATTACK Call 911 if you have these symptoms:  Chest discomfort. Most heart attacks involve discomfort in the center of the chest that lasts more than a few minutes, or that goes away and comes back. It can feel like uncomfortable pressure, squeezing, fullness, or pain.  Discomfort in other areas of the upper body. Symptoms can include pain or discomfort in one or both arms, the back, neck, jaw, or stomach.  Shortness of breath with or without chest discomfort.  
 Other signs may include breaking out in a cold sweat, nausea, or lightheadedness. Don't wait more than five minutes to call [address]! Fast action can save your life. Calling 911 is almost always the fastest way to get lifesaving treatment. Emergency Medical Services staff can begin treatment when they arrive  up to an hour sooner than if someone gets to the hospital by car. The discharge information has been reviewed with the patient. The patient verbalized understanding. Discharge medications reviewed with the patient and appropriate educational materials and side effects teaching were provided. Chart Review Routing History No Routing History on File

## 2017-06-28 NOTE — IP AVS SNAPSHOT
[address] [place] [address] 
[phone] Patient: Jeffrey Ballard MRN: [id number] PSH:1[birth year]28[birth year]4649 You are allergic to the following Allergen Reactions Other Medication Anxiety \"all antidepressants\" Recent Documentation Height Weight BMI Smoking Status 1.727 m 87.4 kg 29.28 kg/m2 Current Every Day Smoker Emergency Contacts Name Discharge Info Relation Home Work Mobile Cyndie Gomezfast   [phone] About your hospitalization You were admitted on:  June 29, 2017 You last received care in the:  [facility] You were discharged on:  June 30, 2017 Unit phone number:  [phone] Why you were hospitalized Your primary diagnosis was:  Not on File Your diagnoses also included:  Schizophrenia (Hcc) Providers Seen During Your Hospitalizations Provider Role Specialty Primary office phone Sherri Altman MD Attending Provider Emergency Medicine [phone] Jagruti Obrien MD Attending Provider Psychiatry [phone] Rhoda Kasper MD Attending Provider Psychiatry [phone] Your Primary Care Physician (PCP) Primary Care Physician Office Phone Office Fax OTHER, PHYS ** None ** ** None ** Follow-up Information Follow up With Details Comments Contact Info Osei Contreras MD   Patient can only remember the practice name and not the physician Dr. Jimmy Ibrahim at the Adult home   you will be seen at the adult home Current Discharge Medication List  
  
CONTINUE these medications which have NOT CHANGED Dose & Instructions Dispensing Information Comments Morning Noon Evening Bedtime DEPAKOTE  mg ER tablet Generic drug:  divalproex ER Your last dose was: Your next dose is:    
   
   
 Dose:  1500 mg Take 1,500 mg by mouth nightly. Indications: MOOD STABILIZATION Refills:  0 FLONASE 50 mcg/actuation nasal spray Generic drug:  fluticasone Your last dose was: Your next dose is:    
   
   
 Dose:  2 Spray 2 Sprays by Both Nostrils route daily. Indications: ALLERGIC RHINITIS Refills:  0  
     
   
   
   
  
 hydrOXYzine pamoate 50 mg capsule Commonly known as:  VISTARIL Your last dose was: Your next dose is:    
   
   
 Dose:  25 mg Take 25 mg by mouth daily as needed for Anxiety. Indications: URTICARIA Refills:  0  
     
   
   
   
  
 lisinopril 10 mg tablet Commonly known as:  Marquita Husbands Your last dose was: Your next dose is:    
   
   
 Dose:  10 mg Take 10 mg by mouth daily. Indications: hypertension Refills:  0  
     
   
   
   
  
 montelukast 10 mg tablet Commonly known as:  SINGULAIR Your last dose was: Your next dose is:    
   
   
 Dose:  10 mg Take 10 mg by mouth daily. Indications: ALLERGIC RHINITIS Refills:  0  
     
   
   
   
  
 neomycin-polymyxin-hydrocortisone (buffered) 3.5-10,000-1 mg/mL-unit/mL-% otic suspension Commonly known as:  Verneice Delores Your last dose was: Your next dose is:    
   
   
 Dose:  3 Drop Administer 3 Drops in right ear four (4) times daily. Refills:  0 NEURONTIN 300 mg capsule Generic drug:  gabapentin Your last dose was: Your next dose is:    
   
   
 Dose:  300 mg Take 300 mg by mouth nightly. Refills:  0  
     
   
   
   
  
 paliperidone 6 mg SR tablet Commonly known as:  INVEGA Your last dose was: Your next dose is:    
   
   
 Dose:  6 mg Take 6 mg by mouth daily. Refills:  0 Senna 8.6 mg Cap Generic drug:  sennosides Your last dose was: Your next dose is:    
   
   
 Dose:  2 Tab Take 2 Tabs by mouth nightly as needed (Constipation).   
 Refills:  0  
     
   
   
   
  
 simvastatin 20 mg tablet Commonly known as:  ZOCOR Your last dose was: Your next dose is:    
   
   
 Dose:  20 mg Take 20 mg by mouth nightly. Indications: hypercholesterolemia Refills:  0  
     
   
   
   
  
 triamcinolone acetonide 0.1 % topical cream  
Commonly known as:  KENALOG Your last dose was: Your next dose is:    
   
   
 Apply  to affected area two (2) times a day. use thin layer Quantity:  15 g Refills:  0 VENTOLIN HFA 90 mcg/actuation inhaler Generic drug:  albuterol Your last dose was: Your next dose is:    
   
   
 Dose:  2 Puff Take 2 Puffs by inhalation every six (6) hours as needed for Wheezing or Shortness of Breath. Refills:  0  
     
   
   
   
  
 VITAMIN D3 2,000 unit Tab Generic drug:  cholecalciferol (vitamin D3) Your last dose was: Your next dose is:    
   
   
 Dose:  2000 Units Take 2,000 Units by mouth daily. Indications: PREVENTION OF VITAMIN D DEFICIENCY Refills:  0 Discharge Instructions DISCHARGE SUMMARY 
 
NAME:Denzel Hopkins : 1955 MRN: [id number] The patient Supriya Sharma exhibits the ability to control behavior in a less restrictive environment. Patient's level of functioning is improving. No assaultive/destructive behavior has been observed for the past 24 hours. No suicidal/homicidal threat or behavior has been observed for the past 24 hours. There is no evidence of serious medication side effects. Patient has not been in physical or protective restraints for at least the past 24 hours. If weapons involved, how are they secured? No weapons involved Is patient aware of and in agreement with discharge plan? Patient and niece are aware of discharge . Arrangements for medication: no prescriptions given to patient. No changes in medications.  
 
Copy of discharge instructions to  provider?:  Yes, given to adult home staff Arrangements for transportation home:  Adult home staff to  Keep all follow up appointments as scheduled, continue to take prescribed medications per physician instructions. Mental health crisis number:  [phone] or your local mental health crisis line number at [phone]. DISCHARGE SUMMARY from Nurse The following personal items are in your possession at time of discharge: 
 
Dental Appliances: None Visual Aid: None Home Medications: None Jewelry: None Clothing: Footwear, Pants, Shirt, Socks, Undergarments (locked in hallway closet) Other Valuables: None PATIENT INSTRUCTIONS: 
 
What to do at Home: 
Recommended activity: Activity as tolerated. If you experience any of the following symptoms overwhelming thoughts of harming self or other, unmanageable hyperactivity or depression, please follow up with 911 or your local mental health crisis line number at [phone] . *  Please give a list of your current medications to your Primary Care Provider. *  Please update this list whenever your medications are discontinued, doses are 
    changed, or new medications (including over-the-counter products) are added. *  Please carry medication information at all times in case of emergency situations. These are general instructions for a healthy lifestyle: No smoking/ No tobacco products/ Avoid exposure to second hand smoke Surgeon General's Warning:  Quitting smoking now greatly reduces serious risk to your health. Obesity, smoking, and sedentary lifestyle greatly increases your risk for illness A healthy diet, regular physical exercise & weight monitoring are important for maintaining a healthy lifestyle You may be retaining fluid if you have a history of heart failure or if you experience any of the following symptoms:  Weight gain of 3 pounds or more overnight or 5 pounds in a week, increased swelling in our hands or feet or shortness of breath while lying flat in bed. Please call your doctor as soon as you notice any of these symptoms; do not wait until your next office visit. Recognize signs and symptoms of STROKE: 
 
F-face looks uneven A-arms unable to move or move unevenly S-speech slurred or non-existent T-time-call 911 as soon as signs and symptoms begin-DO NOT go Back to bed or wait to see if you get better-TIME IS BRAIN. Warning Signs of HEART ATTACK Call 911 if you have these symptoms:  Chest discomfort. Most heart attacks involve discomfort in the center of the chest that lasts more than a few minutes, or that goes away and comes back. It can feel like uncomfortable pressure, squeezing, fullness, or pain.  Discomfort in other areas of the upper body. Symptoms can include pain or discomfort in one or both arms, the back, neck, jaw, or stomach.  Shortness of breath with or without chest discomfort.  Other signs may include breaking out in a cold sweat, nausea, or lightheadedness. Don't wait more than five minutes to call [address]! Fast action can save your life. Calling 911 is almost always the fastest way to get lifesaving treatment. Emergency Medical Services staff can begin treatment when they arrive  up to an hour sooner than if someone gets to the hospital by car. The discharge information has been reviewed with the patient. The patient verbalized understanding. Discharge medications reviewed with the patient and appropriate educational materials and side effects teaching were provided. Discharge Orders None [email or web address] Announcement We are excited to announce that we are making your provider's discharge notes available to you in [employer] Plus. You will see these notes when they are completed and signed by the physician that discharged you from your recent hospital stay.   If you have any questions or concerns about any information you see in Insight Plus, please call the Health Information Department where you were seen or reach out to your Primary Care Provider for more information about your plan of care. Introducing [facility]! Jareth Lux introduces Nekted patient portal. Now you can access parts of your medical record, email your doctor's office, and request medication refills online. 1. In your internet browser, go to [email or web address] 2. Click on the First Time User? Click Here link in the Sign In box. You will see the New Member Sign Up page. 3. Enter your Nekted Access Code exactly as it appears below. You will not need to use this code after youve completed the sign-up process. If you do not sign up before the expiration date, you must request a new code. · Nekted Access Code: [email or web address] Expires: 8/19/2017 12:58 PM 
 
4. Enter the last four digits of your Social Security Number (xxxx) and Date of Birth (mm/dd/yyyy) as indicated and click Submit. You will be taken to the next sign-up page. 5. Create a Nekted ID. This will be your Nekted login ID and cannot be changed, so think of one that is secure and easy to remember. 6. Create a Nekted password. You can change your password at any time. 7. Enter your Password Reset Question and Answer. This can be used at a later time if you forget your password. 8. Enter your e-mail address. You will receive e-mail notification when new information is available in [address] 9. Click Sign Up. You can now view and download portions of your medical record. 10. Click the Download Summary menu link to download a portable copy of your medical information. If you have questions, please visit the Frequently Asked Questions section of the Nekted website. Remember, Nekted is NOT to be used for urgent needs. For medical emergencies, dial 911. Now available from your iPhone and Android! General Information  Please provide this summary of care documentation to your next provider. Patient Signature:  ____________________________________________________________ Date:  ____________________________________________________________  
  
Patrizia Piety Provider Signature:  ____________________________________________________________ Date:  ____________________________________________________________

## 2017-06-28 NOTE — IP AVS SNAPSHOT
Current Discharge Medication List  
  
CONTINUE these medications which have NOT CHANGED Dose & Instructions Dispensing Information Comments Morning Noon Evening Bedtime DEPAKOTE  mg ER tablet Generic drug:  divalproex ER Your last dose was: Your next dose is:    
   
   
 Dose:  1500 mg Take 1,500 mg by mouth nightly. Indications: MOOD STABILIZATION Refills:  0  
     
   
   
   
  
 FLONASE 50 mcg/actuation nasal spray Generic drug:  fluticasone Your last dose was: Your next dose is:    
   
   
 Dose:  2 Spray 2 Sprays by Both Nostrils route daily. Indications: ALLERGIC RHINITIS Refills:  0  
     
   
   
   
  
 hydrOXYzine pamoate 50 mg capsule Commonly known as:  VISTARIL Your last dose was: Your next dose is:    
   
   
 Dose:  25 mg Take 25 mg by mouth daily as needed for Anxiety. Indications: URTICARIA Refills:  0  
     
   
   
   
  
 lisinopril 10 mg tablet Commonly known as:  Pecola Cypher Your last dose was: Your next dose is:    
   
   
 Dose:  10 mg Take 10 mg by mouth daily. Indications: hypertension Refills:  0  
     
   
   
   
  
 montelukast 10 mg tablet Commonly known as:  SINGULAIR Your last dose was: Your next dose is:    
   
   
 Dose:  10 mg Take 10 mg by mouth daily. Indications: ALLERGIC RHINITIS Refills:  0  
     
   
   
   
  
 neomycin-polymyxin-hydrocortisone (buffered) 3.5-10,000-1 mg/mL-unit/mL-% otic suspension Commonly known as:  Naldo August Your last dose was: Your next dose is:    
   
   
 Dose:  3 Drop Administer 3 Drops in right ear four (4) times daily. Refills:  0 NEURONTIN 300 mg capsule Generic drug:  gabapentin Your last dose was: Your next dose is:    
   
   
 Dose:  300 mg Take 300 mg by mouth nightly.   
 Refills:  0  
     
   
   
   
  
 paliperidone 6 mg SR tablet Commonly known as:  INVEGA Your last dose was: Your next dose is:    
   
   
 Dose:  6 mg Take 6 mg by mouth daily. Refills:  0 Senna 8.6 mg Cap Generic drug:  sennosides Your last dose was: Your next dose is:    
   
   
 Dose:  2 Tab Take 2 Tabs by mouth nightly as needed (Constipation). Refills:  0  
     
   
   
   
  
 simvastatin 20 mg tablet Commonly known as:  ZOCOR Your last dose was: Your next dose is:    
   
   
 Dose:  20 mg Take 20 mg by mouth nightly. Indications: hypercholesterolemia Refills:  0  
     
   
   
   
  
 triamcinolone acetonide 0.1 % topical cream  
Commonly known as:  KENALOG Your last dose was: Your next dose is:    
   
   
 Apply  to affected area two (2) times a day. use thin layer Quantity:  15 g Refills:  0 VENTOLIN HFA 90 mcg/actuation inhaler Generic drug:  albuterol Your last dose was: Your next dose is:    
   
   
 Dose:  2 Puff Take 2 Puffs by inhalation every six (6) hours as needed for Wheezing or Shortness of Breath. Refills:  0  
     
   
   
   
  
 VITAMIN D3 2,000 unit Tab Generic drug:  cholecalciferol (vitamin D3) Your last dose was: Your next dose is:    
   
   
 Dose:  2000 Units Take 2,000 Units by mouth daily. Indications: PREVENTION OF VITAMIN D DEFICIENCY Refills:  0

## 2017-06-29 PROBLEM — F20.9 SCHIZOPHRENIA (HCC): Status: ACTIVE | Noted: 2017-06-29

## 2017-06-29 LAB
ALBUMIN SERPL BCP-MCNC: 2.6 G/DL (ref 3.5–5)
ALBUMIN/GLOB SERPL: 0.7 {RATIO} (ref 1.1–2.2)
ALP SERPL-CCNC: 58 U/L (ref 45–117)
ALT SERPL-CCNC: 23 U/L (ref 12–78)
AMPHET UR QL SCN: NEGATIVE
ANION GAP BLD CALC-SCNC: 8 MMOL/L (ref 5–15)
APPEARANCE UR: CLEAR
AST SERPL W P-5'-P-CCNC: 12 U/L (ref 15–37)
BACTERIA URNS QL MICRO: NEGATIVE /HPF
BARBITURATES UR QL SCN: NEGATIVE
BASOPHILS # BLD AUTO: 0 K/UL (ref 0–0.1)
BASOPHILS # BLD: 1 % (ref 0–1)
BENZODIAZ UR QL: NEGATIVE
BILIRUB SERPL-MCNC: 0.5 MG/DL (ref 0.2–1)
BILIRUB UR QL: NEGATIVE
BUN SERPL-MCNC: 12 MG/DL (ref 6–20)
BUN/CREAT SERPL: 11 (ref 12–20)
CALCIUM SERPL-MCNC: 9.5 MG/DL (ref 8.5–10.1)
CANNABINOIDS UR QL SCN: NEGATIVE
CHLORIDE SERPL-SCNC: 105 MMOL/L (ref 97–108)
CO2 SERPL-SCNC: 26 MMOL/L (ref 21–32)
COCAINE UR QL SCN: NEGATIVE
COLOR UR: ABNORMAL
CREAT SERPL-MCNC: 1.07 MG/DL (ref 0.7–1.3)
DRUG SCRN COMMENT,DRGCM: NORMAL
EOSINOPHIL # BLD: 0.4 K/UL (ref 0–0.4)
EOSINOPHIL NFR BLD: 5 % (ref 0–7)
EPITH CASTS URNS QL MICRO: ABNORMAL /LPF
ERYTHROCYTE [DISTWIDTH] IN BLOOD BY AUTOMATED COUNT: 14.5 % (ref 11.5–14.5)
ETHANOL SERPL-MCNC: <10 MG/DL
GLOBULIN SER CALC-MCNC: 3.9 G/DL (ref 2–4)
GLUCOSE SERPL-MCNC: 125 MG/DL (ref 65–100)
GLUCOSE UR STRIP.AUTO-MCNC: NEGATIVE MG/DL
HCT VFR BLD AUTO: 37.7 % (ref 36.6–50.3)
HGB BLD-MCNC: 12.7 G/DL (ref 12.1–17)
HGB UR QL STRIP: NEGATIVE
HYALINE CASTS URNS QL MICRO: ABNORMAL /LPF (ref 0–5)
KETONES UR QL STRIP.AUTO: NEGATIVE MG/DL
LEUKOCYTE ESTERASE UR QL STRIP.AUTO: NEGATIVE
LYMPHOCYTES # BLD AUTO: 30 % (ref 12–49)
LYMPHOCYTES # BLD: 2.4 K/UL (ref 0.8–3.5)
MCH RBC QN AUTO: 31.5 PG (ref 26–34)
MCHC RBC AUTO-ENTMCNC: 33.7 G/DL (ref 30–36.5)
MCV RBC AUTO: 93.5 FL (ref 80–99)
METHADONE UR QL: NEGATIVE
MONOCYTES # BLD: 1 K/UL (ref 0–1)
MONOCYTES NFR BLD AUTO: 13 % (ref 5–13)
NEUTS SEG # BLD: 4.1 K/UL (ref 1.8–8)
NEUTS SEG NFR BLD AUTO: 51 % (ref 32–75)
NITRITE UR QL STRIP.AUTO: NEGATIVE
OPIATES UR QL: NEGATIVE
PCP UR QL: NEGATIVE
PH UR STRIP: 6 [PH] (ref 5–8)
PLATELET # BLD AUTO: 130 K/UL (ref 150–400)
POTASSIUM SERPL-SCNC: 4.2 MMOL/L (ref 3.5–5.1)
PROT SERPL-MCNC: 6.5 G/DL (ref 6.4–8.2)
PROT UR STRIP-MCNC: NEGATIVE MG/DL
RBC # BLD AUTO: 4.03 M/UL (ref 4.1–5.7)
RBC #/AREA URNS HPF: ABNORMAL /HPF (ref 0–5)
SODIUM SERPL-SCNC: 139 MMOL/L (ref 136–145)
SP GR UR REFRACTOMETRY: 1.01 (ref 1–1.03)
UA: UC IF INDICATED,UAUC: ABNORMAL
UROBILINOGEN UR QL STRIP.AUTO: 2 EU/DL (ref 0.2–1)
VALPROATE SERPL-MCNC: 92 UG/ML (ref 50–100)
WBC # BLD AUTO: 8 K/UL (ref 4.1–11.1)
WBC URNS QL MICRO: ABNORMAL /HPF (ref 0–4)

## 2017-06-29 PROCEDURE — 85025 COMPLETE CBC W/AUTO DIFF WBC: CPT | Performed by: PHYSICIAN ASSISTANT

## 2017-06-29 PROCEDURE — 74011250637 HC RX REV CODE- 250/637: Performed by: PSYCHIATRY & NEUROLOGY

## 2017-06-29 PROCEDURE — 80053 COMPREHEN METABOLIC PANEL: CPT | Performed by: PHYSICIAN ASSISTANT

## 2017-06-29 PROCEDURE — 81001 URINALYSIS AUTO W/SCOPE: CPT | Performed by: PHYSICIAN ASSISTANT

## 2017-06-29 PROCEDURE — 80307 DRUG TEST PRSMV CHEM ANLYZR: CPT | Performed by: PHYSICIAN ASSISTANT

## 2017-06-29 PROCEDURE — 36415 COLL VENOUS BLD VENIPUNCTURE: CPT | Performed by: PHYSICIAN ASSISTANT

## 2017-06-29 PROCEDURE — 65220000003 HC RM SEMIPRIVATE PSYCH

## 2017-06-29 PROCEDURE — 80164 ASSAY DIPROPYLACETIC ACD TOT: CPT | Performed by: PHYSICIAN ASSISTANT

## 2017-06-29 RX ORDER — HYDROXYZINE PAMOATE 50 MG/1
25 CAPSULE ORAL
COMMUNITY

## 2017-06-29 RX ORDER — ACETAMINOPHEN 325 MG/1
650 TABLET ORAL
Status: DISCONTINUED | OUTPATIENT
Start: 2017-06-29 | End: 2017-06-30 | Stop reason: HOSPADM

## 2017-06-29 RX ORDER — OLANZAPINE 5 MG/1
5 TABLET ORAL
Status: DISCONTINUED | OUTPATIENT
Start: 2017-06-29 | End: 2017-06-30 | Stop reason: HOSPADM

## 2017-06-29 RX ORDER — IBUPROFEN 200 MG
1 TABLET ORAL
Status: DISCONTINUED | OUTPATIENT
Start: 2017-06-29 | End: 2017-06-30 | Stop reason: HOSPADM

## 2017-06-29 RX ORDER — ADHESIVE BANDAGE
30 BANDAGE TOPICAL DAILY PRN
Status: DISCONTINUED | OUTPATIENT
Start: 2017-06-29 | End: 2017-06-30 | Stop reason: HOSPADM

## 2017-06-29 RX ORDER — DIVALPROEX SODIUM 500 MG/1
1500 TABLET, EXTENDED RELEASE ORAL
COMMUNITY

## 2017-06-29 RX ORDER — ZOLPIDEM TARTRATE 10 MG/1
10 TABLET ORAL
Status: DISCONTINUED | OUTPATIENT
Start: 2017-06-29 | End: 2017-06-30 | Stop reason: HOSPADM

## 2017-06-29 RX ORDER — HYDROXYZINE PAMOATE 25 MG/1
25 CAPSULE ORAL
Status: ON HOLD | COMMUNITY
End: 2017-06-29 | Stop reason: CLARIF

## 2017-06-29 RX ORDER — PALIPERIDONE 6 MG/1
6 TABLET, EXTENDED RELEASE ORAL DAILY
COMMUNITY

## 2017-06-29 RX ORDER — BENZTROPINE MESYLATE 1 MG/ML
2 INJECTION INTRAMUSCULAR; INTRAVENOUS
Status: DISCONTINUED | OUTPATIENT
Start: 2017-06-29 | End: 2017-06-30 | Stop reason: HOSPADM

## 2017-06-29 RX ORDER — LORAZEPAM 2 MG/ML
2 INJECTION INTRAMUSCULAR
Status: DISCONTINUED | OUTPATIENT
Start: 2017-06-29 | End: 2017-06-30 | Stop reason: HOSPADM

## 2017-06-29 RX ORDER — LORAZEPAM 1 MG/1
1 TABLET ORAL
Status: DISCONTINUED | OUTPATIENT
Start: 2017-06-29 | End: 2017-06-30 | Stop reason: HOSPADM

## 2017-06-29 RX ORDER — IBUPROFEN 400 MG/1
400 TABLET ORAL
Status: DISCONTINUED | OUTPATIENT
Start: 2017-06-29 | End: 2017-06-30 | Stop reason: HOSPADM

## 2017-06-29 RX ORDER — BENZTROPINE MESYLATE 2 MG/1
2 TABLET ORAL
Status: DISCONTINUED | OUTPATIENT
Start: 2017-06-29 | End: 2017-06-30 | Stop reason: HOSPADM

## 2017-06-29 RX ADMIN — ZOLPIDEM TARTRATE 10 MG: 10 TABLET ORAL at 21:42

## 2017-06-29 NOTE — BSMART NOTE
Comprehensive Assessment Form Part 1      Section I - Disposition    Axis I - Schizophrenia                Depressive Disorder NOS   Axis II - Deferred  Axis III -   Past Medical History Date Comments      Hypertension [I10]       Ill-defined condition [R69]  high cholesterol     Psychiatric disorder [F99]         Axis IV -  Lacks structure (does not engage in activities)  Axis V - 54      The Medical Doctor to Psychiatrist conference was not completed. The Medical Doctor is in agreement with Psychiatrist disposition because of (reason) patient is a voluntary admission. The plan is admit patient at [facility]. The on-call Psychiatrist consulted was Dr. Serg Harper. The admitting Psychiatrist will be Dr. Serg Harper. The admitting Diagnosis is Schizophrenia . The Payor source is [place] Medicare/[place] Medicare Part A and B. The name of the representative was . This was approved for  days. The authorization number is . Section II - Integrated Summary  Summary:  Patient is 65year old  male reporting to ED Patient arriving c/o 'I think my medications are off\", 'I am hearing voices and they are kissing my hands and feet'. Patient denies SI/HI. Denies ETOH and illicit drugs. At bedside, with cousin and friend patient denied suicidal/ homicidal ideations. Patient reported having visual hallucinations that kiss his hands and feet; patient reported having auditory hallucinations asking him what is on television or asking him to described cars when he was headed to hospital. Patient reported hearing voices since  but stated they are increasing. Patient reported having increased depression over the past year and did not verbalize any triggers. As reported by his cousin director at [facility]  a few months ago. Patient reported getting along with everyone in [facility]. Patient reported that he felt he did not want to be at [facility] any longer did not verbalize why.  Patient was observed responding to internal stimuli during assessment. Patient was cooperative and open to communicating. As reported patient was living on his own about three years ago but his behaviors increasing. Patient's niece Michael Alonso can be called about patient; as reported she is power of  and she brought patient to ED. [phone]  The patient has demonstrated mental capacity to provide informed consent. The information is given by the patient and relative(s). The Chief Complaint is hallucinations/ depression. The Precipitant Factors are mental health. Previous Hospitalizations: yes  The patient has not previously been in restraints. Current Psychiatrist and/or  is unknown. Lethality Assessment:    The potential for suicide noted by the following: not noted . The potential for homicide is not noted. The patient has not been a perpetrator of sexual or physical abuse. There are not pending charges. The patient is not felt to be at risk for self harm or harm to others. The attending nurse was advised not noted. Section III - Psychosocial  The patient's overall mood and attitude is cooperative, low mood. Feelings of helplessness and hopelessness are not observed. Generalized anxiety is not observed. Panic is not observed. Phobias are not observed. Obsessive compulsive tendencies are not observed. Section IV - Mental Status Exam  The patient's appearance shows no evidence of impairment. The patient's behavior shows no evidence of impairment. The patient is oriented to time, place, person and situation. The patient's speech shows no evidence of impairment. The patient's mood is depressed. The range of affect shows no evidence of impairment. The patient's thought content demonstrates no evidence of impairment. The thought process shows no evidence of impairment.   The patient's perception demonstrated changes in the following:  auditory  visual. The patient's memory shows no evidence of impairment. The patient's appetite shows no evidence of impairment. The patient's sleep shows no evidence of impairment. The patient's insight shows no evidence of impairment. The patient's judgement shows no evidence of impairment. Section V - Substance Abuse  The patient is using substances. The patient is using tobacco by inhalation for greater than 10 years with last use on yesterday. The patient has experienced the following withdrawal symptoms: N/A. Section VI - Living Arrangements  The patient is single. The patient lives [address] [facility]. The patient has no children. The patient does plan to return home upon discharge. The patient does not have legal issues pending. The patient's source of income comes from employment. Jew and cultural practices have not been voiced at this time. The patient's greatest support comes from family/ friend and this person will not be involved with the treatment. The patient has not been in an event described as horrible or outside the realm of ordinary life experience either currently or in the past.  The patient has not been a victim of sexual/physical abuse. Section VII - Other Areas of Clinical Concern  The highest grade achieved is 12 th with the overall quality of school experience being described as unknown. The patient is currently unemployed and speaks [place] as a primary language. The patient has no communication impairments affecting communication. The patient's preference for learning can be described as: can read and write adequately.   The patient's hearing is normal.  The patient's vision is normal.      [facility]

## 2017-06-29 NOTE — BH NOTES
GROUP THERAPY PROGRESS NOTE    Abdelrahman Cannon participated in an afternoon Process Group on the Acute Unit with a focus identifying feelings, planning for the rest of the day, and reviewing DBT skills for dealing with emotional distress, Distress Regulation.      Group time: 45 minutes. Personal goal for participation: To increase the capacity to improve ones mood, structure, and coping capacity. Goal orientation: The patient will be able to identify their feelings, develop a plan for structuring their day, and begin to appreciate the possibility of successfully managing distress and other forms of intense emotions. Group therapy participation: With prompting, this patient partially participated in the group. Therapeutic interventions reviewed and discussed: The group members were asked to introduce themselves to each other and to see if they could identify an emotion they are having and/or let the group know what they want to focus on for the day as they continue to make discharge plans. The group members also reviewed five suggested areas of DBT options when experiencing intense emotions: distraction, improve the moment, self-soothe, pros and cons, and radical acceptance. They were asked to take turns reading from the handout and discussing its contents. At the end of group the patients were provided a summary of the topics covered. The patients who attended were also offered a worksheet on the DBT concepts of Distress Regulation to complete on their own time. Impression of participation: The patient joined the group about senior living through and was called for his commitment hearing about five minutes afterwards. He did not return to group. While in group he said he was here in the hospital to have his medication re-adjusted. The patient expressed no current SI/HI nor any overt psychotic symptoms in the limited time he was in group. The patient did not have time to contribute further.  His affect was flat; his mood anxious; and his thinking concrete. He was alert and appeared generally oriented.

## 2017-06-29 NOTE — BH NOTES
PSYCHOSOCIAL ASSESSMENT  :Patient identifying info:  Colette Scott is a 65 y.o., male admitted 2017 10:51 PM     Presenting problem and precipitating factors: Patient was brought to the ER by his niece due to hearing voice and \" they are kissing my hands and feet. \".      Current psychiatric providers and contact info: Arianna Lynn at the adult home     Previous psychiatric services/providers and response to treatment: he has a long history of treatment        Substance abuse history:    Social History   Substance Use Topics    Smoking status: Current Every Day Smoker     Packs/day: 0.25    Smokeless tobacco: Not on file    Alcohol use No       Family constellation: single,. No children     Is significant other involved?        Describe support system: niece Ortega Rodriguez [phone]    Describe living arrangements and home environment:lives at [facility] issues:   Hospital Problems  Never Reviewed          Codes Class Noted POA    Schizophrenia [facility]) ICD-10-CM: F20.9  ICD-9-CM: 295.90  2017 Unknown              Trauma history: none noted     Legal issues: no    History of  service: no    Financial status: SSDI     Shinto/cultural factors: none noted     Education/work history:high school education      Have you been licensed as a stacy care professional (current or ): no  Leisure and recreation preferences:   Describe coping skills:    Regine Boogie  2017

## 2017-06-29 NOTE — BH NOTES
Primary Nurse Cherry Tobias RN and Abdias Benson RN performed a dual skin assessment on this patient Impairment noted: Scattered red dots throughout the body. Per, pt he has a hx of bed bugs. Pt states the [facility] facility in which he currently resides in is being sprayed. Maximilian score is 23    Pressure Injury Documentation  (COMPLETE ONE LABEL PER PRESSURE INJURY)  For further information, please review corresponding Wound Care flowsheet. Nehemiah Granado has:    No pressure injury noted and pressure ulcer prevention initiated.       Cherry Tobias RN

## 2017-06-29 NOTE — PROGRESS NOTES
Problem: Altered Thought Process (Adult/Pediatric) Goals to be met by 07/07/2017  Goal: *STG: Participates in treatment plan  Outcome: Progressing Towards Goal  Patient denies SI/HI. Patient is quiet but out of his room and participates in milieu. Goal: *STG: Remains safe in hospital  Outcome: Progressing Towards Goal  Patient remains safe, NAD noted. Problem: Falls - Risk of--- Goals to be met by 07/07/2017    Goal: *Absence of falls  Outcome: Progressing Towards Goal  Patient remains free from falls.        [address] [address]  Master Treatment Plan for Gavino Stoll    Date Treatment Plan Initiated: 06/29/2017    Treatment Plan Modalities:  Type of Modality Amount  (x minutes) Frequency (x/week) Duration (x days) Name of Responsible Staff   Community & wrap-up meetings to encourage peer interactions 13 7 1 MYESHA Pearson   Group psychotherapy to assist in building coping skills and internal controls 60 7 1 Arsalan Guillen LCSW   Therapeutic activity groups to build coping skills 60 7 1 Arsalan Guillen LCSW   Psychoeducation in group setting to address:   Medication education   15 7 1 Bri Jennings RN   Coping skills   20 1 1 Flora Garrido RN    Relaxation techniques         Symptom management         Discharge planning   [address], [place]   Spirituality    60 279 [facility]         Recovery/AA/NA         Physician medication management   13 7 1 Dr. Luna Aguirre

## 2017-06-29 NOTE — ROUTINE PROCESS
TRANSFER - OUT REPORT:    Verbal report given to Monika Michelle RN (name) on Chris Brown  being transferred to [facility] (unit) for routine progression of care       Report consisted of patients Situation, Background, Assessment and   Recommendations(SBAR). Information from the following report(s) SBAR, ED Summary and Recent Results was reviewed with the receiving nurse. Lines:       Opportunity for questions and clarification was provided.       Patient transported with:   Registered Nurse

## 2017-06-29 NOTE — PROGRESS NOTES
TRANSFER - IN REPORT:    Verbal report received from [facility] RN(name) on Mary Gaines  being received from ED(unit) for routine progression of care      Report consisted of patients Situation, Background, Assessment and   Recommendations(SBAR). Information from the following report(s) SBAR was reviewed with the receiving nurse. Opportunity for questions and clarification was provided. Assessment completed upon patients arrival to unit and care assumed.

## 2017-06-29 NOTE — BH NOTES
Admission Note:    -Patient admitted Voluntary to  [address] [place] [address] Dr Cassidy, under the services of .    -Patient currently denying suicidal ideation.    -Patient currently denying homicidal ideation.    -Patient verbally contracts for safety.    -Patient has psychotic symptoms. Pt actively hallucinating. \" The voices are asking me if I have a nice ass, and they are asking me about other people's tits. \"    -Pt Denying ETOH use.    -Pt  Has a negative drug screen

## 2017-06-29 NOTE — ED PROVIDER NOTES
HPI Comments: 66 yo male with hx of HTN, depression and schizophrenia here for mental health evaluation. Pt lives in group home and family member was called as he was having increased hallucinations. Pt states he is hearing voices and he can feel them \"kissing my hands and feet\". Denies SI/HI. Denies fever, chills, CP, SOB, abd pain, flank pain, urinary symptoms. +Smoker. Patient is a 65 y.o. male presenting with mental health disorder. The history is provided by the patient and a relative. Mental Health Problem    This is a recurrent problem. The problem has been gradually worsening. Associated symptoms include hallucinations. Pertinent negatives include no seizures. Past Medical History:   Diagnosis Date    Hypertension     Ill-defined condition     high cholesterol    Psychiatric disorder     depression       History reviewed. No pertinent surgical history. History reviewed. No pertinent family history. Social History     Social History    Marital status: SINGLE     Spouse name: N/A    Number of children: N/A    Years of education: N/A     Occupational History    Not on file. Social History Main Topics    Smoking status: Current Every Day Smoker     Packs/day: 0.25    Smokeless tobacco: Not on file    Alcohol use No    Drug use: No    Sexual activity: Not on file     Other Topics Concern    Not on file     Social History Narrative         ALLERGIES: Other medication    Review of Systems   Constitutional: Negative for activity change and fever. HENT: Negative for facial swelling. Eyes: Negative for discharge. Respiratory: Negative for cough. Cardiovascular: Negative for leg swelling. Gastrointestinal: Negative for abdominal distention. Skin: Negative for color change. Neurological: Negative for seizures and syncope. Psychiatric/Behavioral: Positive for hallucinations. Negative for suicidal ideas.        Vitals:    06/28/17 2248   BP: 90/69   Pulse: 71   Resp: 14   Temp: 97.6 °F (36.4 °C)   SpO2: 95%   Weight: 87.4 kg (192 lb 9.6 oz)   Height: 5' 8\" (1.727 m)            Physical Exam   Constitutional: He is oriented to person, place, and time. He appears well-developed and well-nourished. HENT:   Head: Normocephalic and atraumatic. Right Ear: External ear normal.   Left Ear: External ear normal.   Nose: Nose normal.   Mouth/Throat: Oropharynx is clear and moist.   Eyes: Conjunctivae and EOM are normal. Pupils are equal, round, and reactive to light. Right eye exhibits no discharge. Left eye exhibits no discharge. Neck: Normal range of motion. Neck supple. Cardiovascular: Normal rate, regular rhythm, normal heart sounds and intact distal pulses. Pulmonary/Chest: Effort normal and breath sounds normal.   Abdominal: Soft. Bowel sounds are normal. He exhibits no distension. There is no tenderness. There is no rebound and no guarding. Musculoskeletal: Normal range of motion. He exhibits no edema or tenderness. Neurological: He is alert and oriented to person, place, and time. No cranial nerve deficit. Coordination normal.   Skin: Skin is warm and dry. No rash noted. Psychiatric: Judgment normal. He is actively hallucinating. Thought content is paranoid. He expresses no suicidal ideation. Nursing note and vitals reviewed. MDM  Number of Diagnoses or Management Options  Hallucination:      Amount and/or Complexity of Data Reviewed  Clinical lab tests: ordered and reviewed  Discuss the patient with other providers: yes      ED Course       Procedures    [employer] in to see pt. RACHANA Evangelista    Pt to be admitted to psych. RACHANA Evangelista    Pt medically cleared.  RACHANA Evangelista

## 2017-06-29 NOTE — H&P
INITIAL PSYCHIATRIC EVALUATION         IDENTIFICATION:    Patient Name  Nehemiah Granado   Date of Birth 1955   [facility] [id number]   Medical Record Number  [id number]      Age  65 y.o. PCP Osei Contreras MD   Admit date:  6/28/2017    Room Number  732/01  @ [facility]   Date of Service  6/29/2017            HISTORY         REASON FOR HOSPITALIZATION:  CC: \"ongoing unchanged AH, and full traetment compliance\". Pt admitted under a voluntary basis psychosis in partial remission due to complete medication compliance possibly having inability to care for self. He lives in an SNF. HISTORY OF PRESENT ILLNESS:    The patient, Nehemiah Granado, is a 65 y.o. UNKNOWN male with a past psychiatric history significant for schizophrenia, who presents at this time with complaints of (and/or evidence of) the following emotional symptoms: psychosis. The above symptoms have been present for decades. These symptoms are of mild severity. These symptoms are intermittent/ fleeting in nature, and are not disturbing to the patient or disabling. The patient's condition has been precipitated by Niece reacting to the pt's SNF moving to another city. Patient is stable. UDS: negative; BAL=0. ALLERGIES:  Allergies   Allergen Reactions    Other Medication Anxiety     \"all antidepressants\"      MEDICATIONS PRIOR TO ADMISSION:  Prescriptions Prior to Admission   Medication Sig    divalproex ER (DEPAKOTE ER) 500 mg ER tablet Take 1,500 mg by mouth nightly.  hydrOXYzine pamoate (VISTARIL) 50 mg capsule Take 25 mg by mouth daily as needed for Anxiety. Indications: URTICARIA    paliperidone (INVEGA) 6 mg SR tablet Take 6 mg by mouth daily.  montelukast (SINGULAIR) 10 mg tablet Take 10 mg by mouth daily.  fluticasone (FLONASE) 50 mcg/actuation nasal spray 2 Sprays by Both Nostrils route daily.  cholecalciferol, vitamin D3, (VITAMIN D3) 2,000 unit tab Take 2,000 Units by mouth.     neomycin-polymyxin-hydrocortisone, buffered, (PEDIOTIC) 3.5-10,000-1 mg/mL-unit/mL-% otic suspension Administer 3 Drops in right ear four (4) times daily.  albuterol (VENTOLIN HFA) 90 mcg/actuation inhaler Take 2 Puffs by inhalation.  triamcinolone acetonide (KENALOG) 0.1 % topical cream Apply  to affected area two (2) times a day. use thin layer    lisinopril (PRINIVIL, ZESTRIL) 10 mg tablet Take 10 mg by mouth daily.  simvastatin (ZOCOR) 10 mg tablet Take 20 mg by mouth nightly.  gabapentin (NEURONTIN) 300 mg capsule Take 300 mg by mouth nightly.  sennosides (SENNA) 8.6 mg cap Take 2 Tabs by mouth nightly as needed (Constipation). PAST MEDICAL HISTORY:  Past Medical History:   Diagnosis Date    Hypertension     Ill-defined condition     high cholesterol    Psychiatric disorder     depression   History reviewed. No pertinent surgical history. SOCIAL HISTORY:    Social History     Social History    Marital status: SINGLE     Spouse name: N/A    Number of children: N/A    Years of education: N/A     Occupational History    Not on file. Social History Main Topics    Smoking status: Current Every Day Smoker     Packs/day: 0.25    Smokeless tobacco: Not on file    Alcohol use No    Drug use: No    Sexual activity: Not on file     Other Topics Concern    Not on file     Social History Narrative    65year old  male with chronic schizophrenia. Pt is medication compliant, and asymptomatic, except for backgroud \"voices\" that he actually cannot discern what they are saying, and that he \"has had since 1988\" and is totally at ease with. Pt denied SI/HI intent and plan. He had goal directed thinking, and was in no way internally preoccupied. FAMILY HISTORY:    History reviewed. No pertinent family history. REVIEW OF SYSTEMS:   Psychological ROS: negative for - very mild chronic AH's that are not dietressing to pt and that he can tune out.   Respiratory ROS: no cough, shortness of breath, or wheezing  Cardiovascular ROS: no chest pain or dyspnea on exertion  Pertinent items are noted in the History of Present Illness. All other Systems reviewed and are considered negative. MENTAL STATUS EXAM & VITALS         MENTAL STATUS EXAM (MSE):    MSE FINDINGS ARE WITHIN NORMAL LIMITS (WNL) UNLESS OTHERWISE STATED BELOW. ( ALL OF THE BELOW CATEGORIES OF THE MSE HAVE BEEN REVIEWED (reviewed 6/29/2017) AND UPDATED AS DEEMED APPROPRIATE )  General Presentation age appropriate, cooperative   Orientation oriented to time, place and person   Vital Signs  See below (reviewed 6/29/2017); Vital Signs (BP, Pulse, & Temp) are within normal limits if not listed below.    Gait and Station Stable/steady, no ataxia   Musculoskeletal System No extrapyramidal symptoms (EPS); no abnormal muscular movements or Tardive Dyskinesia (TD); muscle strength and tone are within normal limits   Language No aphasia or dysarthria   Speech:  monotone   Thought Processes logical; normal rate of thoughts; fair abstract reasoning/computation   Thought Associations goal directed   Thought Content free of delusions   Suicidal Ideations none   Homicidal Ideations none   Mood:  stable    Affect:  mood-congruent   Memory recent  fair   Memory remote:  fair   Concentration/Attention:  fair   Fund of Knowledge average   Insight:  fair   Reliability fair   Judgment:  fair            VITALS:     Patient Vitals for the past 24 hrs:   Temp Pulse Resp BP SpO2   06/29/17 1230 98 °F (36.7 °C) 74 16 105/72 -   06/29/17 0801 98.4 °F (36.9 °C) 66 16 (!) 86/60 97 %   06/29/17 0320 97.2 °F (36.2 °C) 66 16 105/71 96 %   06/29/17 0234 97.7 °F (36.5 °C) (!) 55 16 107/68 98 %   06/28/17 2248 97.6 °F (36.4 °C) 71 14 90/69 95 %     Wt Readings from Last 3 Encounters:   06/28/17 87.4 kg (192 lb 9.6 oz)   06/10/17 99.2 kg (218 lb 9.6 oz)   05/21/17 99.8 kg (220 lb)     Temp Readings from Last 3 Encounters:   06/29/17 98 °F (36.7 °C)   06/10/17 97.7 °F (36.5 °C)   05/21/17 97.9 °F (36.6 °C) BP Readings from Last 3 Encounters:   06/29/17 105/72   06/10/17 105/60   05/21/17 123/67     Pulse Readings from Last 3 Encounters:   06/29/17 74   06/10/17 68   05/21/17 68            DATA       LABORATORY DATA:  Labs Reviewed   URINALYSIS W/ REFLEX CULTURE - Abnormal; Notable for the following:        Result Value    Urobilinogen 2.0 (*)     All other components within normal limits   CBC WITH AUTOMATED DIFF - Abnormal; Notable for the following:     RBC 4.03 (*)     PLATELET 950 (*)     All other components within normal limits   METABOLIC PANEL, COMPREHENSIVE - Abnormal; Notable for the following:     Glucose 125 (*)     BUN/Creatinine ratio 11 (*)     AST (SGOT) 12 (*)     Albumin 2.6 (*)     A-G Ratio 0.7 (*)     All other components within normal limits   DRUG SCREEN, URINE   ETHYL ALCOHOL   VALPROIC ACID   SAMPLES BEING HELD     Admission on 06/28/2017   Component Date Value Ref Range Status    Color 06/29/2017 DARK YELLOW    Final    Appearance 06/29/2017 CLEAR  CLEAR   Final    Specific gravity 06/29/2017 1.014  1.003 - 1.030   Final    pH (UA) 06/29/2017 6.0  5.0 - 8.0   Final    Protein 06/29/2017 NEGATIVE   NEG mg/dL Final    Glucose 06/29/2017 NEGATIVE   NEG mg/dL Final    Ketone 06/29/2017 NEGATIVE   NEG mg/dL Final    Bilirubin 06/29/2017 NEGATIVE   NEG   Final    Blood 06/29/2017 NEGATIVE   NEG   Final    Urobilinogen 06/29/2017 2.0* 0.2 - 1.0 EU/dL Final    Nitrites 06/29/2017 NEGATIVE   NEG   Final    Leukocyte Esterase 06/29/2017 NEGATIVE   NEG   Final    WBC 06/29/2017 0-4  0 - 4 /hpf Final    RBC 06/29/2017 0-5  0 - 5 /hpf Final    Epithelial cells 06/29/2017 FEW  FEW /lpf Final    Bacteria 06/29/2017 NEGATIVE   NEG /hpf Final    UA:UC IF INDICATED 06/29/2017 CULTURE NOT INDICATED BY UA RESULT  CNI   Final    Hyaline cast 06/29/2017 2-5  0 - 5 /lpf Final    AMPHETAMINE 06/29/2017 NEGATIVE   NEG   Final    BARBITURATES 06/29/2017 NEGATIVE   NEG   Final    BENZODIAZEPINE 06/29/2017 NEGATIVE   NEG   Final    COCAINE 06/29/2017 NEGATIVE   NEG   Final    METHADONE 06/29/2017 NEGATIVE   NEG   Final    OPIATES 06/29/2017 NEGATIVE   NEG   Final    PCP(PHENCYCLIDINE) 06/29/2017 NEGATIVE   NEG   Final    THC (TH-CANNABINOL) 06/29/2017 NEGATIVE   NEG   Final    Drug screen comment 06/29/2017 (NOTE)   Final    WBC 06/29/2017 8.0  4.1 - 11.1 K/uL Final    RBC 06/29/2017 4.03* 4.10 - 5.70 M/uL Final    HGB 06/29/2017 12.7  12.1 - 17.0 g/dL Final    HCT 06/29/2017 37.7  36.6 - 50.3 % Final    MCV 06/29/2017 93.5  80.0 - 99.0 FL Final    MCH 06/29/2017 31.5  26.0 - 34.0 PG Final    MCHC 06/29/2017 33.7  30.0 - 36.5 g/dL Final    RDW 06/29/2017 14.5  11.5 - 14.5 % Final    PLATELET 86/08/4515 464* 150 - 400 K/uL Final    NEUTROPHILS 06/29/2017 51  32 - 75 % Final    LYMPHOCYTES 06/29/2017 30  12 - 49 % Final    MONOCYTES 06/29/2017 13  5 - 13 % Final    EOSINOPHILS 06/29/2017 5  0 - 7 % Final    BASOPHILS 06/29/2017 1  0 - 1 % Final    ABS. NEUTROPHILS 06/29/2017 4.1  1.8 - 8.0 K/UL Final    ABS. LYMPHOCYTES 06/29/2017 2.4  0.8 - 3.5 K/UL Final    ABS. MONOCYTES 06/29/2017 1.0  0.0 - 1.0 K/UL Final    ABS. EOSINOPHILS 06/29/2017 0.4  0.0 - 0.4 K/UL Final    ABS.  BASOPHILS 06/29/2017 0.0  0.0 - 0.1 K/UL Final    Sodium 06/29/2017 139  136 - 145 mmol/L Final    Potassium 06/29/2017 4.2  3.5 - 5.1 mmol/L Final    Chloride 06/29/2017 105  97 - 108 mmol/L Final    CO2 06/29/2017 26  21 - 32 mmol/L Final    Anion gap 06/29/2017 8  5 - 15 mmol/L Final    Glucose 06/29/2017 125* 65 - 100 mg/dL Final    BUN 06/29/2017 12  6 - 20 MG/DL Final    Creatinine 06/29/2017 1.07  0.70 - 1.30 MG/DL Final    BUN/Creatinine ratio 06/29/2017 11* 12 - 20   Final    GFR est AA 06/29/2017 >60  >60 ml/min/1.73m2 Final    GFR est non-AA 06/29/2017 >60  >60 ml/min/1.73m2 Final    Calcium 06/29/2017 9.5  8.5 - 10.1 MG/DL Final    Bilirubin, total 06/29/2017 0.5  0.2 - 1.0 MG/DL Final    ALT (SGPT) 06/29/2017 23  12 - 78 U/L Final    AST (SGOT) 06/29/2017 12* 15 - 37 U/L Final    Alk. phosphatase 06/29/2017 58  45 - 117 U/L Final    Protein, total 06/29/2017 6.5  6.4 - 8.2 g/dL Final    Albumin 06/29/2017 2.6* 3.5 - 5.0 g/dL Final    Globulin 06/29/2017 3.9  2.0 - 4.0 g/dL Final    A-G Ratio 06/29/2017 0.7* 1.1 - 2.2   Final    ALCOHOL(ETHYL),SERUM 06/29/2017 <10  <10 MG/DL Final    Valproic acid 06/29/2017 92  50 - 100 ug/ml Final        RADIOLOGY REPORTS:  No results found for this or any previous visit. Ct Head Wo Cont    Result Date: 6/10/2017  EXAM:  CT HEAD WO CONT INDICATION:   head trauma, weakness COMPARISON: None. TECHNIQUE: Unenhanced CT of the head was performed using 5 mm images. Brain and bone windows were generated. CT dose reduction was achieved through use of a standardized protocol tailored for this examination and automatic exposure control for dose modulation. FINDINGS: There is prominence of the ventricles and cortical sulci, consistent with cerebral volume loss. CSF spaces are slightly more prominent on the left. . There is no significant white matter disease. There is no intracranial hemorrhage, extra-axial collection, mass, mass effect or midline shift. The basilar cisterns are open. No acute infarct is identified. The bone windows demonstrate no abnormalities. The visualized portions of the paranasal sinuses and mastoid air cells are clear. IMPRESSION: No acute findings. Cerebral volume loss.               MEDICATIONS       ALL MEDICATIONS  Current Facility-Administered Medications   Medication Dose Route Frequency    ziprasidone (GEODON) 20 mg in sterile water (preservative free) 1 mL injection  20 mg IntraMUSCular BID PRN    OLANZapine (ZyPREXA) tablet 5 mg  5 mg Oral Q6H PRN    benztropine (COGENTIN) tablet 2 mg  2 mg Oral BID PRN    benztropine (COGENTIN) injection 2 mg  2 mg IntraMUSCular BID PRN    LORazepam (ATIVAN) injection 2 mg  2 mg IntraMUSCular Q4H PRN    LORazepam (ATIVAN) tablet 1 mg  1 mg Oral Q4H PRN    zolpidem (AMBIEN) tablet 10 mg  10 mg Oral QHS PRN    acetaminophen (TYLENOL) tablet 650 mg  650 mg Oral Q4H PRN    ibuprofen (MOTRIN) tablet 400 mg  400 mg Oral Q8H PRN    magnesium hydroxide (MILK OF MAGNESIA) 400 mg/5 mL oral suspension 30 mL  30 mL Oral DAILY PRN    nicotine (NICODERM CQ) 21 mg/24 hr patch 1 Patch  1 Patch TransDERmal DAILY PRN      SCHEDULED MEDICATIONS  Current Facility-Administered Medications   Medication Dose Route Frequency                ASSESSMENT & PLAN        The patient Diana Umaña is a 65 y.o.  male who presents at this time for treatment of the following diagnoses:  Patient Active Hospital Problem List:   Schizophrenia [facility]) (6/29/2017)    Assessment: delusions, hallucinations, and thought disorder    Plan: Pt has had symptom remission due to med compliance. Continue current care          In summary, Diana Umaña presents with a severe exacerbation of the principal diagnosis, <principal problem not specified>    While on the unit Diana Umaña will be provided with individual, milieu, occupational, group, and substance abuse therapies to address target symptoms as deemed appropriate for the individual patient. I agree with decision to admit patient. I have spoken to [facility] psychiatric /ED staff regarding the nature of patients's admission at this time. A coordinated, multidisplinary treatment team (includes the nurse, unit pharmcist,  and writer) round was conducted for this initial evaluation with the patient present. The following regarding medications was addressed during rounds with patient:   the risks and benefits of the proposed medication. The patient was given the opportunity to ask questions. Informed consent given to the use of the above medications.      I will continue to adjust psychiatric and non-psychiatric medications (see above \"medication\" section and orders section for details) as deemed appropriate & based upon diagnoses and response to treatment. I have reviewed admission (and previous/old) labs and medical tests in the EHR and or transferring hospital documents. I will continue to order blood tests/labs and diagnostic tests as deemed appropriate and review results as they become available (see orders for details). I have reviewed old psychiatric and medical records available in the EHR. I Will order additional psychiatric records from other institutions to further elucidate the nature of patient's psychopathology and review once available. I will gather additional collateral information from friends, family and o/p treatment team to further elucidate the nature of patient's psychopathology and baselline level of psychiatric functioning.         ESTIMATED LENGTH OF STAY:   1-2 days       STRENGTHS:  Access to housing/residential stability, Interpersonal/supportive relationships (family, friends, peers) and Knowledge of medications                      SIGNED:    Lisset Sloan MD  6/29/2017

## 2017-06-29 NOTE — DISCHARGE SUMMARY
PSYCHIATRIC DISCHARGE SUMMARY         IDENTIFICATION:    Patient Name  Jim Cm   Date of Birth 1955   [facility] [id number]   Medical Record Number  [id number]      Age  65 y.o. PCP Osei Contreras, MD   Admit date:  6/28/2017    Discharge date: 6/29/2017   Room Number  732/01  @ [facility]   Date of Service  6/29/2017               TYPE OF DISCHARGE: REGULAR               CONDITION AT DISCHARGE: good and improved       PROVISIONAL & DISCHARGE DIAGNOSES:    Problem List  Never Reviewed          Codes Class    [facility] ICD-10-CM: F20.9  ICD-9-CM: 295.90               Active Hospital Problems    Schizophrenia ([facility]        DISCHARGE DIAGNOSIS:   Axis I:  SEE ABOVE  Axis II: SEE ABOVE  Axis III: SEE ABOVE  Axis IV:  lack of structure  Axis V:  60 on admission, 60 on discharge 60(baseline)       CC & HISTORY OF PRESENT ILLNESS:  65year old  male admitted voluntarily at the request of his niece for reportedly worsening AH. Patient denied this worsening and clearly ststed that \"I've had these voices since 1988\" and he is not at all disturbed by them. His AH are background chatter that say nothing discernable. He denies SI/HI O/C/P/D/VH. He is medication compliant. He requested discharege and after observing him, there was no imminent danger to self/ others or medication adjustments that needed to be made. SOCIAL HISTORY:    Social History     Social History    Marital status: SINGLE     Spouse name: N/A    Number of children: N/A    Years of education: N/A     Occupational History    Not on file. Social History Main Topics    Smoking status: Current Every Day Smoker     Packs/day: 0.25    Smokeless tobacco: Not on file    Alcohol use No    Drug use: No    Sexual activity: Not on file     Other Topics Concern    Not on file     Social History Narrative    65year old  male with chronic schizophrenia.  Pt is medication compliant, and asymptomatic, except for backgroud \"voices\" that he actually cannot discern what they are saying, and that he \"has had since 1988\" and is totally at ease with. Pt denied SI/HI intent and plan. He had goal directed thinking, and was in no way internally preoccupied. FAMILY HISTORY:   History reviewed. No pertinent family history. HOSPITALIZATION COURSE:    Kaity Leon was admitted to the inpatient psychiatric unit [facility] for acute psychiatric stabilization in regards to symptomatology as described in the HPI above. The differential diagnosis at time of admission included: bipolar dis vs. schizoaffective vs. Schizophrenia. While on the unit Kaity Leon was involved in individual, group, occupational and milieu therapy. Kaity Leon demonstrated a slow, but progressive improvement in overall condition. Much of patient's depression appeared to be related to situational stressors and psychological factors. Please see individual progress notes for more specific details regarding patient's hospitalization course. At time of dc, Kaity Leon was without significant problems with depression psychosis  dom. Overall presentation at time of discharge is most consistent with the diagnosis of schizophrenia Patient with request for discharge today. There are no grounds to seek a TDO. Patient has maximized benefit to be derived from acute inpatient psychiatric treatment.   All members of the treatment team concur with each other in regards to plans for discharge today per patient's request.          LABS AND IMAGAING:    Labs Reviewed   URINALYSIS W/ REFLEX CULTURE - Abnormal; Notable for the following:        Result Value    Urobilinogen 2.0 (*)     All other components within normal limits   CBC WITH AUTOMATED DIFF - Abnormal; Notable for the following:     RBC 4.03 (*)     PLATELET 706 (*)     All other components within normal limits   METABOLIC PANEL, COMPREHENSIVE - Abnormal; Notable for the following: Glucose 125 (*)     BUN/Creatinine ratio 11 (*)     AST (SGOT) 12 (*)     Albumin 2.6 (*)     A-G Ratio 0.7 (*)     All other components within normal limits   DRUG SCREEN, URINE   ETHYL ALCOHOL   VALPROIC ACID   SAMPLES BEING HELD     Admission on 06/28/2017   Component Date Value Ref Range Status    Color 06/29/2017 DARK YELLOW    Final    Appearance 06/29/2017 CLEAR  CLEAR   Final    Specific gravity 06/29/2017 1.014  1.003 - 1.030   Final    pH (UA) 06/29/2017 6.0  5.0 - 8.0   Final    Protein 06/29/2017 NEGATIVE   NEG mg/dL Final    Glucose 06/29/2017 NEGATIVE   NEG mg/dL Final    Ketone 06/29/2017 NEGATIVE   NEG mg/dL Final    Bilirubin 06/29/2017 NEGATIVE   NEG   Final    Blood 06/29/2017 NEGATIVE   NEG   Final    Urobilinogen 06/29/2017 2.0* 0.2 - 1.0 EU/dL Final    Nitrites 06/29/2017 NEGATIVE   NEG   Final    Leukocyte Esterase 06/29/2017 NEGATIVE   NEG   Final    WBC 06/29/2017 0-4  0 - 4 /hpf Final    RBC 06/29/2017 0-5  0 - 5 /hpf Final    Epithelial cells 06/29/2017 FEW  FEW /lpf Final    Bacteria 06/29/2017 NEGATIVE   NEG /hpf Final    UA:UC IF INDICATED 06/29/2017 CULTURE NOT INDICATED BY UA RESULT  CNI   Final    Hyaline cast 06/29/2017 2-5  0 - 5 /lpf Final    AMPHETAMINE 06/29/2017 NEGATIVE   NEG   Final    BARBITURATES 06/29/2017 NEGATIVE   NEG   Final    BENZODIAZEPINE 06/29/2017 NEGATIVE   NEG   Final    COCAINE 06/29/2017 NEGATIVE   NEG   Final    METHADONE 06/29/2017 NEGATIVE   NEG   Final    OPIATES 06/29/2017 NEGATIVE   NEG   Final    PCP(PHENCYCLIDINE) 06/29/2017 NEGATIVE   NEG   Final    THC (TH-CANNABINOL) 06/29/2017 NEGATIVE   NEG   Final    Drug screen comment 06/29/2017 (NOTE)   Final    WBC 06/29/2017 8.0  4.1 - 11.1 K/uL Final    RBC 06/29/2017 4.03* 4.10 - 5.70 M/uL Final    HGB 06/29/2017 12.7  12.1 - 17.0 g/dL Final    HCT 06/29/2017 37.7  36.6 - 50.3 % Final    MCV 06/29/2017 93.5  80.0 - 99.0 FL Final    [facility] 06/29/2017 31.5  26.0 - 34.0 PG Final  MCHC 06/29/2017 33.7  30.0 - 36.5 g/dL Final    RDW 06/29/2017 14.5  11.5 - 14.5 % Final    PLATELET 74/46/0244 584* 150 - 400 K/uL Final    NEUTROPHILS 06/29/2017 51  32 - 75 % Final    LYMPHOCYTES 06/29/2017 30  12 - 49 % Final    MONOCYTES 06/29/2017 13  5 - 13 % Final    EOSINOPHILS 06/29/2017 5  0 - 7 % Final    BASOPHILS 06/29/2017 1  0 - 1 % Final    ABS. NEUTROPHILS 06/29/2017 4.1  1.8 - 8.0 K/UL Final    ABS. LYMPHOCYTES 06/29/2017 2.4  0.8 - 3.5 K/UL Final    ABS. MONOCYTES 06/29/2017 1.0  0.0 - 1.0 K/UL Final    ABS. EOSINOPHILS 06/29/2017 0.4  0.0 - 0.4 K/UL Final    ABS. BASOPHILS 06/29/2017 0.0  0.0 - 0.1 K/UL Final    Sodium 06/29/2017 139  136 - 145 mmol/L Final    Potassium 06/29/2017 4.2  3.5 - 5.1 mmol/L Final    Chloride 06/29/2017 105  97 - 108 mmol/L Final    CO2 06/29/2017 26  21 - 32 mmol/L Final    Anion gap 06/29/2017 8  5 - 15 mmol/L Final    Glucose 06/29/2017 125* 65 - 100 mg/dL Final    BUN 06/29/2017 12  6 - 20 MG/DL Final    Creatinine 06/29/2017 1.07  0.70 - 1.30 MG/DL Final    BUN/Creatinine ratio 06/29/2017 11* 12 - 20   Final    GFR est AA 06/29/2017 >60  >60 ml/min/1.73m2 Final    GFR est non-AA 06/29/2017 >60  >60 ml/min/1.73m2 Final    Calcium 06/29/2017 9.5  8.5 - 10.1 MG/DL Final    Bilirubin, total 06/29/2017 0.5  0.2 - 1.0 MG/DL Final    ALT (SGPT) 06/29/2017 23  12 - 78 U/L Final    AST (SGOT) 06/29/2017 12* 15 - 37 U/L Final    Alk. phosphatase 06/29/2017 58  45 - 117 U/L Final    Protein, total 06/29/2017 6.5  6.4 - 8.2 g/dL Final    Albumin 06/29/2017 2.6* 3.5 - 5.0 g/dL Final    Globulin 06/29/2017 3.9  2.0 - 4.0 g/dL Final    A-G Ratio 06/29/2017 0.7* 1.1 - 2.2   Final    ALCOHOL(ETHYL),SERUM 06/29/2017 <10  <10 MG/DL Final    Valproic acid 06/29/2017 92  50 - 100 ug/ml Final     Ct Head Wo Cont    Result Date: 6/10/2017  EXAM:  CT HEAD WO CONT INDICATION:   head trauma, weakness COMPARISON: None.  TECHNIQUE: Unenhanced CT of the head was performed using 5 mm images. Brain and bone windows were generated. CT dose reduction was achieved through use of a standardized protocol tailored for this examination and automatic exposure control for dose modulation. FINDINGS: There is prominence of the ventricles and cortical sulci, consistent with cerebral volume loss. CSF spaces are slightly more prominent on the left. . There is no significant white matter disease. There is no intracranial hemorrhage, extra-axial collection, mass, mass effect or midline shift. The basilar cisterns are open. No acute infarct is identified. The bone windows demonstrate no abnormalities. The visualized portions of the paranasal sinuses and mastoid air cells are clear. IMPRESSION: No acute findings. Cerebral volume loss. DISPOSITION:    Home. Patient to f/u with o/p psychiatric, and psychotherapy appointments. Patient is to f/u with internist as directed. FOLLOW-UP CARE:    Activity as tolerated  Regular Diet  Wound Care: none needed. Follow-up Information     Follow up With Details Comments Contact Info    Phys Other, MD   Patient can only remember the practice name and not the physician                   PROGNOSIS:  Greatly dependent upon patient's ability to f/u with o/p psychiatric/psychotherapy appointments as well as to comply with psychiatric medications as prescribed. Patient denies suicidal or homicidal ideations. Neda Dennis fully contracts for safety. Patient reports many positive predictive factors in terms of not attempting suicide or homicide. Patient appears to be at low risk of suicide or homicide. Patient and family are aware and in agreement with discharge and discharge plan. DISCHARGE MEDICATIONS: (no changes made).     Informed consent given for the use of following psychotropic medications:  Current Discharge Medication List      CONTINUE these medications which have NOT CHANGED    Details divalproex ER (DEPAKOTE ER) 500 mg ER tablet Take 1,500 mg by mouth nightly. hydrOXYzine pamoate (VISTARIL) 50 mg capsule Take 25 mg by mouth daily as needed for Anxiety. Indications: URTICARIA      paliperidone (INVEGA) 6 mg SR tablet Take 6 mg by mouth daily. montelukast (SINGULAIR) 10 mg tablet Take 10 mg by mouth daily. fluticasone (FLONASE) 50 mcg/actuation nasal spray 2 Sprays by Both Nostrils route daily. cholecalciferol, vitamin D3, (VITAMIN D3) 2,000 unit tab Take 2,000 Units by mouth. neomycin-polymyxin-hydrocortisone, buffered, (PEDIOTIC) 3.5-10,000-1 mg/mL-unit/mL-% otic suspension Administer 3 Drops in right ear four (4) times daily. albuterol (VENTOLIN HFA) 90 mcg/actuation inhaler Take 2 Puffs by inhalation. triamcinolone acetonide (KENALOG) 0.1 % topical cream Apply  to affected area two (2) times a day. use thin layer  Qty: 15 g, Refills: 0      lisinopril (PRINIVIL, ZESTRIL) 10 mg tablet Take 10 mg by mouth daily. simvastatin (ZOCOR) 10 mg tablet Take 20 mg by mouth nightly.      gabapentin (NEURONTIN) 300 mg capsule Take 300 mg by mouth nightly. sennosides (SENNA) 8.6 mg cap Take 2 Tabs by mouth nightly as needed (Constipation). A coordinated, multidisplinary treatment team round was conducted with Jessica Marshall is done daily here at [facility] . This team consists of the nurse, psychiatric unit pharmcist,  and writer. I have spent greater than 35 minutes on discharge work.     Signed:  Bolivar Casanova MD  6/29/2017

## 2017-06-29 NOTE — ED TRIAGE NOTES
Patient arriving c/o 'I think my medications are off\", 'I am hearing voices and they are kissing my hands and feet'. Patient denies SI/HI. Denies ETOH and illicit drugs.

## 2017-06-29 NOTE — DISCHARGE INSTRUCTIONS
DISCHARGE SUMMARY    NAME:Denzel Mulligan  : 1955  MRN: [id number]    The patient [facility] exhibits the ability to control behavior in a less restrictive environment. Patient's level of functioning is improving. No assaultive/destructive behavior has been observed for the past 24 hours. No suicidal/homicidal threat or behavior has been observed for the past 24 hours. There is no evidence of serious medication side effects. Patient has not been in physical or protective restraints for at least the past 24 hours. If weapons involved, how are they secured? No weapons involved     Is patient aware of and in agreement with discharge plan? Patient and niece are aware of discharge . Arrangements for medication: no prescriptions given to patient. No changes in medications. Copy of discharge instructions to  provider?:  Yes, given to adult home staff     Arrangements for transportation home:  Adult home staff to      Keep all follow up appointments as scheduled, continue to take prescribed medications per physician instructions. Mental health crisis number:  [phone] or your local mental health crisis line number at [phone]. DISCHARGE SUMMARY from Nurse    The following personal items are in your possession at time of discharge:    Dental Appliances: None  Visual Aid: None     Home Medications: None  Jewelry: None  Clothing: Footwear, Pants, Shirt, Socks, Undergarments (locked in hallway closet)  Other Valuables: None             PATIENT INSTRUCTIONS:    What to do at Home:  Recommended activity: Activity as tolerated. If you experience any of the following symptoms overwhelming thoughts of harming self or other, unmanageable hyperactivity or depression, please follow up with 911 or your local mental health crisis line number at [phone] . *  Please give a list of your current medications to your Primary Care Provider.     *  Please update this list whenever your medications are discontinued, doses are      changed, or new medications (including over-the-counter products) are added. *  Please carry medication information at all times in case of emergency situations. These are general instructions for a healthy lifestyle:    No smoking/ No tobacco products/ Avoid exposure to second hand smoke    Surgeon General's Warning:  Quitting smoking now greatly reduces serious risk to your health. Obesity, smoking, and sedentary lifestyle greatly increases your risk for illness    A healthy diet, regular physical exercise & weight monitoring are important for maintaining a healthy lifestyle    You may be retaining fluid if you have a history of heart failure or if you experience any of the following symptoms:  Weight gain of 3 pounds or more overnight or 5 pounds in a week, increased swelling in our hands or feet or shortness of breath while lying flat in bed. Please call your doctor as soon as you notice any of these symptoms; do not wait until your next office visit. Recognize signs and symptoms of STROKE:    F-face looks uneven    A-arms unable to move or move unevenly    S-speech slurred or non-existent    T-time-call 911 as soon as signs and symptoms begin-DO NOT go       Back to bed or wait to see if you get better-TIME IS BRAIN. Warning Signs of HEART ATTACK     Call 911 if you have these symptoms:   Chest discomfort. Most heart attacks involve discomfort in the center of the chest that lasts more than a few minutes, or that goes away and comes back. It can feel like uncomfortable pressure, squeezing, fullness, or pain.  Discomfort in other areas of the upper body. Symptoms can include pain or discomfort in one or both arms, the back, neck, jaw, or stomach.  Shortness of breath with or without chest discomfort.  Other signs may include breaking out in a cold sweat, nausea, or lightheadedness. Don't wait more than five minutes to call 911 - MINUTES MATTER!  Fast action can save your life. Calling 911 is almost always the fastest way to get lifesaving treatment. Emergency Medical Services staff can begin treatment when they arrive -- up to an hour sooner than if someone gets to the hospital by car. The discharge information has been reviewed with the patient. The patient verbalized understanding. Discharge medications reviewed with the patient and appropriate educational materials and side effects teaching were provided.

## 2017-06-29 NOTE — PROGRESS NOTES
Problem: Altered Thought Process (Adult/Pediatric)  Goal: *STG: Remains safe in hospital  Outcome: Progressing Towards Goal  Pt resting in bed on initial round. Pt voices no concerns at this time. Staff will continue to monitor with 15 minute safety checks.

## 2017-06-30 VITALS
DIASTOLIC BLOOD PRESSURE: 79 MMHG | RESPIRATION RATE: 16 BRPM | WEIGHT: 192.6 LBS | BODY MASS INDEX: 29.19 KG/M2 | HEART RATE: 70 BPM | SYSTOLIC BLOOD PRESSURE: 106 MMHG | TEMPERATURE: 97.7 F | HEIGHT: 68 IN | OXYGEN SATURATION: 99 %

## 2017-06-30 LAB
CHOLEST SERPL-MCNC: 133 MG/DL
GLUCOSE P FAST SERPL-MCNC: 77 MG/DL (ref 65–100)
HDLC SERPL-MCNC: 29 MG/DL
HDLC SERPL: 4.6 {RATIO} (ref 0–5)
LDLC SERPL CALC-MCNC: 65.6 MG/DL (ref 0–100)
LIPID PROFILE,FLP: ABNORMAL
TRIGL SERPL-MCNC: 192 MG/DL (ref ?–150)
TSH SERPL DL<=0.05 MIU/L-ACNC: 1.01 UIU/ML (ref 0.36–3.74)
VLDLC SERPL CALC-MCNC: 38.4 MG/DL

## 2017-06-30 PROCEDURE — 36415 COLL VENOUS BLD VENIPUNCTURE: CPT | Performed by: PSYCHIATRY & NEUROLOGY

## 2017-06-30 PROCEDURE — 74011250637 HC RX REV CODE- 250/637: Performed by: PSYCHIATRY & NEUROLOGY

## 2017-06-30 PROCEDURE — 80061 LIPID PANEL: CPT | Performed by: PSYCHIATRY & NEUROLOGY

## 2017-06-30 PROCEDURE — 82947 ASSAY GLUCOSE BLOOD QUANT: CPT | Performed by: PSYCHIATRY & NEUROLOGY

## 2017-06-30 PROCEDURE — 84443 ASSAY THYROID STIM HORMONE: CPT | Performed by: PSYCHIATRY & NEUROLOGY

## 2017-06-30 RX ORDER — SIMVASTATIN 20 MG/1
20 TABLET, FILM COATED ORAL
COMMUNITY

## 2017-06-30 RX ADMIN — IBUPROFEN 400 MG: 400 TABLET, FILM COATED ORAL at 09:28

## 2017-06-30 NOTE — DIABETES MGMT
DTC Progress Note    Recommendations/ Comments: Pt's chart reviewed due to a blood sugar of 77 mg/dl this morning. Pt with no documented history of diabetes. Blood sugars in the 70's are within normal limits for patients without diabetes. Chart reviewed on [facility]. Patient is a 65 y.o. male with no documented history of diabetes. A1c:   No results found for: HBA1C, HGBE8, [id number]    Recent Glucose Results:   Lab Results   Component Value Date/Time    GLU 77 06/30/2017 06:16 AM        Lab Results   Component Value Date/Time    Creatinine 1.07 06/29/2017 12:06 AM     Estimated Creatinine Clearance: 77.9 mL/min (based on Cr of 1.07). Active Orders   Diet    DIET REGULAR        PO intake: No data found. Current hospital DM medication: none    Will continue to follow as needed.     Thank you  Matt Cornelius RD, CDE

## 2017-06-30 NOTE — PROGRESS NOTES
Pharmacist Discharge Medication Reconciliation    Discharging Provider: Dr. Madelin Lara PMH:   Past Medical History:   Diagnosis Date    Hypertension     Ill-defined condition     high cholesterol    Psychiatric disorder     depression     Chief Complaint for this Admission:   Chief Complaint   Patient presents with    Mental Health Problem     Allergies: Other medication    Discharge Medications:   Current Discharge Medication List        CONTINUE these medications which have NOT CHANGED    Details   simvastatin (ZOCOR) 20 mg tablet Take 20 mg by mouth nightly. Indications: hypercholesterolemia      divalproex ER (DEPAKOTE ER) 500 mg ER tablet Take 1,500 mg by mouth nightly. Indications: MOOD STABILIZATION      hydrOXYzine pamoate (VISTARIL) 50 mg capsule Take 25 mg by mouth daily as needed for Anxiety. Indications: URTICARIA      paliperidone (INVEGA) 6 mg SR tablet Take 6 mg by mouth daily. montelukast (SINGULAIR) 10 mg tablet Take 10 mg by mouth daily. Indications: ALLERGIC RHINITIS      fluticasone (FLONASE) 50 mcg/actuation nasal spray 2 Sprays by Both Nostrils route daily. Indications: ALLERGIC RHINITIS      cholecalciferol, vitamin D3, (VITAMIN D3) 2,000 unit tab Take 2,000 Units by mouth daily. Indications: PREVENTION OF VITAMIN D DEFICIENCY      neomycin-polymyxin-hydrocortisone, buffered, (PEDIOTIC) 3.5-10,000-1 mg/mL-unit/mL-% otic suspension Administer 3 Drops in right ear four (4) times daily. albuterol (VENTOLIN HFA) 90 mcg/actuation inhaler Take 2 Puffs by inhalation every six (6) hours as needed for Wheezing or Shortness of Breath. triamcinolone acetonide (KENALOG) 0.1 % topical cream Apply  to affected area two (2) times a day. use thin layer  Qty: 15 g, Refills: 0      lisinopril (PRINIVIL, ZESTRIL) 10 mg tablet Take 10 mg by mouth daily. Indications: hypertension      gabapentin (NEURONTIN) 300 mg capsule Take 300 mg by mouth nightly.       sennosides (SENNA) 8.6 mg cap Take 2 Tabs by mouth nightly as needed (Constipation).              The patient's chart, MAR and AVS were reviewed by Noah Mae PHARMD.

## 2017-06-30 NOTE — BH NOTES
Behavioral Health Transition Record to Provider    Patient Name: Mary Gaines  YOB: 1955  Medical Record Number: [id number]  Date of Admission: 6/28/2017  Date of Discharge: 6/30/2017    Attending Provider: Tyler Dinh MD  Discharging Provider: Dr. Vasques Signs   To contact this individual call  and ask the  to page. If unavailable, ask to be transferred to [facility] Provider on call. [facility] Provider will be available on call 24/7 and during holidays. Primary Care Provider: Osei Contreras MD    Allergies   Allergen Reactions    Other Medication Anxiety     \"all antidepressants\"          H&P Summary Notes      H&P by Tyler Dinh MD at 06/29/17 1422     Author:  Tyler Dinh MD Service:  PSYCHIATRY Author Type:  Physician    Filed:  06/29/17 1433 Date of Service:  06/29/17 1427 Status:  Signed    :  Tyler Dinh MD (Physician)             INITIAL PSYCHIATRIC EVALUATION         IDENTIFICATION:    Patient Name  Mary Gaines   Date of Birth 1955   [facility] [id number]   Medical Record Number  [id number]      Age  65 y.o. PCP Osei Contreras MD   Admit date:  6/28/2017    Room Number  732/01  @ [facility]   Date of Service  6/29/2017            HISTORY         REASON FOR HOSPITALIZATION:  CC: \"ongoing unchanged AH, and full traetment compliance\". Pt admitted under a voluntary basis psychosis in partial remission due to complete medication compliance possibly having inability to care for self. He lives in an SNF. HISTORY OF PRESENT ILLNESS:    The patient, Mary Gaines, is a 65 y.o. UNKNOWN male with a past psychiatric history significant for schizophrenia, who presents at this time with complaints of (and/or evidence of) the following emotional symptoms: psychosis. The above symptoms have been present for decades. These symptoms are of mild severity. These symptoms are intermittent/ fleeting in nature, and are not disturbing to the patient or disabling. The patient's condition has been precipitated by Niece reacting to the pt's SNF moving to another city. Patient is stable. UDS: negative; BAL=0. ALLERGIES:  Allergies   Allergen Reactions    Other Medication Anxiety     \"all antidepressants\"      MEDICATIONS PRIOR TO ADMISSION:  Prescriptions Prior to Admission   Medication Sig    divalproex ER (DEPAKOTE ER) 500 mg ER tablet Take 1,500 mg by mouth nightly.  hydrOXYzine pamoate (VISTARIL) 50 mg capsule Take 25 mg by mouth daily as needed for Anxiety. Indications: URTICARIA    paliperidone (INVEGA) 6 mg SR tablet Take 6 mg by mouth daily.  montelukast (SINGULAIR) 10 mg tablet Take 10 mg by mouth daily.  fluticasone (FLONASE) 50 mcg/actuation nasal spray 2 Sprays by Both Nostrils route daily.  cholecalciferol, vitamin D3, (VITAMIN D3) 2,000 unit tab Take 2,000 Units by mouth.  neomycin-polymyxin-hydrocortisone, buffered, (PEDIOTIC) 3.5-10,000-1 mg/mL-unit/mL-% otic suspension Administer 3 Drops in right ear four (4) times daily.  albuterol (VENTOLIN HFA) 90 mcg/actuation inhaler Take 2 Puffs by inhalation.  triamcinolone acetonide (KENALOG) 0.1 % topical cream Apply  to affected area two (2) times a day. use thin layer    lisinopril (PRINIVIL, ZESTRIL) 10 mg tablet Take 10 mg by mouth daily.  simvastatin (ZOCOR) 10 mg tablet Take 20 mg by mouth nightly.  gabapentin (NEURONTIN) 300 mg capsule Take 300 mg by mouth nightly.  sennosides (SENNA) 8.6 mg cap Take 2 Tabs by mouth nightly as needed (Constipation). PAST MEDICAL HISTORY:  Past Medical History:   Diagnosis Date    Hypertension     Ill-defined condition     high cholesterol    Psychiatric disorder     depression   History reviewed. No pertinent surgical history. SOCIAL HISTORY:    Social History     Social History    Marital status: SINGLE     Spouse name: N/A    Number of children: N/A    Years of education: N/A     Occupational History    Not on file.      Social History Main Topics    Smoking status: Current Every Day Smoker     Packs/day: 0.25    Smokeless tobacco: Not on file    Alcohol use No    Drug use: No    Sexual activity: Not on file     Other Topics Concern    Not on file     Social History Narrative    65year old  male with chronic schizophrenia. Pt is medication compliant, and asymptomatic, except for backgroud \"voices\" that he actually cannot discern what they are saying, and that he \"has had since 1988\" and is totally at ease with. Pt denied SI/HI intent and plan. He had goal directed thinking, and was in no way internally preoccupied. FAMILY HISTORY:    History reviewed. No pertinent family history. REVIEW OF SYSTEMS:   Psychological ROS: negative for - very mild chronic AH's that are not dietressing to pt and that he can tune out. Respiratory ROS: no cough, shortness of breath, or wheezing  Cardiovascular ROS: no chest pain or dyspnea on exertion  Pertinent items are noted in the History of Present Illness. All other Systems reviewed and are considered negative. MENTAL STATUS EXAM & VITALS         MENTAL STATUS EXAM (MSE):    MSE FINDINGS ARE WITHIN NORMAL LIMITS (WNL) UNLESS OTHERWISE STATED BELOW. ( ALL OF THE BELOW CATEGORIES OF THE MSE HAVE BEEN REVIEWED (reviewed 6/29/2017) AND UPDATED AS DEEMED APPROPRIATE )  General Presentation age appropriate, cooperative   Orientation oriented to time, place and person   Vital Signs  See below (reviewed 6/29/2017); Vital Signs (BP, Pulse, & Temp) are within normal limits if not listed below.    Gait and Station Stable/steady, no ataxia   Musculoskeletal System No extrapyramidal symptoms (EPS); no abnormal muscular movements or Tardive Dyskinesia (TD); muscle strength and tone are within normal limits   Language No aphasia or dysarthria   Speech:  monotone   Thought Processes logical; normal rate of thoughts; fair abstract reasoning/computation   Thought Associations goal directed Thought Content free of delusions   Suicidal Ideations none   Homicidal Ideations none   Mood:  stable    Affect:  mood-congruent   Memory recent  fair   Memory remote:  fair   Concentration/Attention:  fair   Fund of Knowledge average   Insight:  fair   Reliability fair   Judgment:  fair            VITALS:     Patient Vitals for the past 24 hrs:   Temp Pulse Resp BP SpO2   06/29/17 1230 98 °F (36.7 °C) 74 16 105/72 -   06/29/17 0801 98.4 °F (36.9 °C) 66 16 (!) 86/60 97 %   06/29/17 0320 97.2 °F (36.2 °C) 66 16 105/71 96 %   06/29/17 0234 97.7 °F (36.5 °C) (!) 55 16 107/68 98 %   06/28/17 2248 97.6 °F (36.4 °C) 71 14 90/69 95 %     Wt Readings from Last 3 Encounters:   06/28/17 87.4 kg (192 lb 9.6 oz)   06/10/17 99.2 kg (218 lb 9.6 oz)   05/21/17 99.8 kg (220 lb)     Temp Readings from Last 3 Encounters:   06/29/17 98 °F (36.7 °C)   06/10/17 97.7 °F (36.5 °C)   05/21/17 97.9 °F (36.6 °C)     BP Readings from Last 3 Encounters:   06/29/17 105/72   06/10/17 105/60   05/21/17 123/67     Pulse Readings from Last 3 Encounters:   06/29/17 74   06/10/17 68   05/21/17 68            DATA       LABORATORY DATA:  Labs Reviewed   URINALYSIS W/ REFLEX CULTURE - Abnormal; Notable for the following:        Result Value    Urobilinogen 2.0 (*)     All other components within normal limits   CBC WITH AUTOMATED DIFF - Abnormal; Notable for the following:     RBC 4.03 (*)     PLATELET 597 (*)     All other components within normal limits   METABOLIC PANEL, COMPREHENSIVE - Abnormal; Notable for the following:     Glucose 125 (*)     BUN/Creatinine ratio 11 (*)     AST (SGOT) 12 (*)     Albumin 2.6 (*)     A-G Ratio 0.7 (*)     All other components within normal limits   DRUG SCREEN, URINE   ETHYL ALCOHOL   VALPROIC ACID   SAMPLES BEING HELD     Admission on 06/28/2017   Component Date Value Ref Range Status    Color 06/29/2017 DARK YELLOW    Final    Appearance 06/29/2017 CLEAR  CLEAR   Final    Specific gravity 06/29/2017 1.014 1.003 - 1.030   Final    pH (UA) 06/29/2017 6.0  5.0 - 8.0   Final    Protein 06/29/2017 NEGATIVE   NEG mg/dL Final    Glucose 06/29/2017 NEGATIVE   NEG mg/dL Final    Ketone 06/29/2017 NEGATIVE   NEG mg/dL Final    Bilirubin 06/29/2017 NEGATIVE   NEG   Final    Blood 06/29/2017 NEGATIVE   NEG   Final    Urobilinogen 06/29/2017 2.0* 0.2 - 1.0 EU/dL Final    Nitrites 06/29/2017 NEGATIVE   NEG   Final    Leukocyte Esterase 06/29/2017 NEGATIVE   NEG   Final    WBC 06/29/2017 0-4  0 - 4 /hpf Final    RBC 06/29/2017 0-5  0 - 5 /hpf Final    Epithelial cells 06/29/2017 FEW  FEW /lpf Final    Bacteria 06/29/2017 NEGATIVE   NEG /hpf Final    UA:UC IF INDICATED 06/29/2017 CULTURE NOT INDICATED BY UA RESULT  CNI   Final    Hyaline cast 06/29/2017 2-5  0 - 5 /lpf Final    AMPHETAMINE 06/29/2017 NEGATIVE   NEG   Final    BARBITURATES 06/29/2017 NEGATIVE   NEG   Final    BENZODIAZEPINE 06/29/2017 NEGATIVE   NEG   Final    COCAINE 06/29/2017 NEGATIVE   NEG   Final    METHADONE 06/29/2017 NEGATIVE   NEG   Final    OPIATES 06/29/2017 NEGATIVE   NEG   Final    PCP(PHENCYCLIDINE) 06/29/2017 NEGATIVE   NEG   Final    THC (TH-CANNABINOL) 06/29/2017 NEGATIVE   NEG   Final    Drug screen comment 06/29/2017 (NOTE)   Final    WBC 06/29/2017 8.0  4.1 - 11.1 K/uL Final    RBC 06/29/2017 4.03* 4.10 - 5.70 M/uL Final    HGB 06/29/2017 12.7  12.1 - 17.0 g/dL Final    HCT 06/29/2017 37.7  36.6 - 50.3 % Final    MCV 06/29/2017 93.5  80.0 - 99.0 FL Final    MCH 06/29/2017 31.5  26.0 - 34.0 PG Final    MCHC 06/29/2017 33.7  30.0 - 36.5 g/dL Final    RDW 06/29/2017 14.5  11.5 - 14.5 % Final    PLATELET 80/80/1543 268* 150 - 400 K/uL Final    NEUTROPHILS 06/29/2017 51  32 - 75 % Final    LYMPHOCYTES 06/29/2017 30  12 - 49 % Final    MONOCYTES 06/29/2017 13  5 - 13 % Final    EOSINOPHILS 06/29/2017 5  0 - 7 % Final    BASOPHILS 06/29/2017 1  0 - 1 % Final    ABS.  NEUTROPHILS 06/29/2017 4.1  1.8 - 8.0 K/UL Final  ABS. LYMPHOCYTES 06/29/2017 2.4  0.8 - 3.5 K/UL Final    ABS. MONOCYTES 06/29/2017 1.0  0.0 - 1.0 K/UL Final    ABS. EOSINOPHILS 06/29/2017 0.4  0.0 - 0.4 K/UL Final    ABS. BASOPHILS 06/29/2017 0.0  0.0 - 0.1 K/UL Final    Sodium 06/29/2017 139  136 - 145 mmol/L Final    Potassium 06/29/2017 4.2  3.5 - 5.1 mmol/L Final    Chloride 06/29/2017 105  97 - 108 mmol/L Final    CO2 06/29/2017 26  21 - 32 mmol/L Final    Anion gap 06/29/2017 8  5 - 15 mmol/L Final    Glucose 06/29/2017 125* 65 - 100 mg/dL Final    BUN 06/29/2017 12  6 - 20 MG/DL Final    Creatinine 06/29/2017 1.07  0.70 - 1.30 MG/DL Final    BUN/Creatinine ratio 06/29/2017 11* 12 - 20   Final    GFR est AA 06/29/2017 >60  >60 ml/min/1.73m2 Final    GFR est non-AA 06/29/2017 >60  >60 ml/min/1.73m2 Final    Calcium 06/29/2017 9.5  8.5 - 10.1 MG/DL Final    Bilirubin, total 06/29/2017 0.5  0.2 - 1.0 MG/DL Final    ALT (SGPT) 06/29/2017 23  12 - 78 U/L Final    AST (SGOT) 06/29/2017 12* 15 - 37 U/L Final    Alk. phosphatase 06/29/2017 58  45 - 117 U/L Final    Protein, total 06/29/2017 6.5  6.4 - 8.2 g/dL Final    Albumin 06/29/2017 2.6* 3.5 - 5.0 g/dL Final    Globulin 06/29/2017 3.9  2.0 - 4.0 g/dL Final    A-G Ratio 06/29/2017 0.7* 1.1 - 2.2   Final    ALCOHOL(ETHYL),SERUM 06/29/2017 <10  <10 MG/DL Final    Valproic acid 06/29/2017 92  50 - 100 ug/ml Final        RADIOLOGY REPORTS:  No results found for this or any previous visit. Ct Head Wo Cont    Result Date: 6/10/2017  EXAM:  CT HEAD WO CONT INDICATION:   head trauma, weakness COMPARISON: None. TECHNIQUE: Unenhanced CT of the head was performed using 5 mm images. Brain and bone windows were generated. CT dose reduction was achieved through use of a standardized protocol tailored for this examination and automatic exposure control for dose modulation. FINDINGS: There is prominence of the ventricles and cortical sulci, consistent with cerebral volume loss.  CSF spaces are slightly more prominent on the left. . There is no significant white matter disease. There is no intracranial hemorrhage, extra-axial collection, mass, mass effect or midline shift. The basilar cisterns are open. No acute infarct is identified. The bone windows demonstrate no abnormalities. The visualized portions of the paranasal sinuses and mastoid air cells are clear. IMPRESSION: No acute findings. Cerebral volume loss. MEDICATIONS       ALL MEDICATIONS  Current Facility-Administered Medications   Medication Dose Route Frequency    ziprasidone (GEODON) 20 mg in sterile water (preservative free) 1 mL injection  20 mg IntraMUSCular BID PRN    OLANZapine (ZyPREXA) tablet 5 mg  5 mg Oral Q6H PRN    benztropine (COGENTIN) tablet 2 mg  2 mg Oral BID PRN    benztropine (COGENTIN) injection 2 mg  2 mg IntraMUSCular BID PRN    LORazepam (ATIVAN) injection 2 mg  2 mg IntraMUSCular Q4H PRN    LORazepam (ATIVAN) tablet 1 mg  1 mg Oral Q4H PRN    zolpidem (AMBIEN) tablet 10 mg  10 mg Oral QHS PRN    acetaminophen (TYLENOL) tablet 650 mg  650 mg Oral Q4H PRN    ibuprofen (MOTRIN) tablet 400 mg  400 mg Oral Q8H PRN    magnesium hydroxide (MILK OF MAGNESIA) 400 mg/5 mL oral suspension 30 mL  30 mL Oral DAILY PRN    nicotine (NICODERM CQ) 21 mg/24 hr patch 1 Patch  1 Patch TransDERmal DAILY PRN      SCHEDULED MEDICATIONS  Current Facility-Administered Medications   Medication Dose Route Frequency                ASSESSMENT & PLAN        The patient Christine Mercado is a 65 y.o.  male who presents at this time for treatment of the following diagnoses:  Patient Active Hospital Problem List:   Schizophrenia [facility]) (6/29/2017)    Assessment: delusions, hallucinations, and thought disorder    Plan: Pt has had symptom remission due to med compliance.  Continue current care          In summary, Christine Mercado presents with a severe exacerbation of the principal diagnosis, <principal problem not specified>    While on the unit [place] will be provided with individual, milieu, occupational, group, and substance abuse therapies to address target symptoms as deemed appropriate for the individual patient. I agree with decision to admit patient. I have spoken to [facility] psychiatric /ED staff regarding the nature of patients's admission at this time. A coordinated, multidisplinary treatment team (includes the nurse, unit pharmcist,  and writer) round was conducted for this initial evaluation with the patient present. The following regarding medications was addressed during rounds with patient:   the risks and benefits of the proposed medication. The patient was given the opportunity to ask questions. Informed consent given to the use of the above medications. I will continue to adjust psychiatric and non-psychiatric medications (see above \"medication\" section and orders section for details) as deemed appropriate & based upon diagnoses and response to treatment. I have reviewed admission (and previous/old) labs and medical tests in the EHR and or transferring hospital documents. I will continue to order blood tests/labs and diagnostic tests as deemed appropriate and review results as they become available (see orders for details). I have reviewed old psychiatric and medical records available in the EHR. I Will order additional psychiatric records from other institutions to further elucidate the nature of patient's psychopathology and review once available. I will gather additional collateral information from friends, family and o/p treatment team to further elucidate the nature of patient's psychopathology and baselline level of psychiatric functioning.         ESTIMATED LENGTH OF STAY:   1-2 days       STRENGTHS:  Access to housing/residential stability, Interpersonal/supportive relationships (family, friends, peers) and Knowledge of medications                      SIGNED: Sariah Cooper MD  6/29/2017[MH1.1]                  Revision History       User Key Date/Time User Provider Type Action    > MH1.1 06/29/17 2003 Sariah Cooper MD Physician Sign              Admission Diagnosis: Schizophrenia [facility])    * No surgery found *    Results for orders placed or performed during the hospital encounter of 06/28/17   URINALYSIS W/ REFLEX CULTURE   Result Value Ref Range    Color DARK YELLOW      Appearance CLEAR CLEAR      Specific gravity 1.014 1.003 - 1.030      pH (UA) 6.0 5.0 - 8.0      Protein NEGATIVE  NEG mg/dL    Glucose NEGATIVE  NEG mg/dL    Ketone NEGATIVE  NEG mg/dL    Bilirubin NEGATIVE  NEG      Blood NEGATIVE  NEG      Urobilinogen 2.0 (H) 0.2 - 1.0 EU/dL    Nitrites NEGATIVE  NEG      Leukocyte Esterase NEGATIVE  NEG      WBC 0-4 0 - 4 /hpf    RBC 0-5 0 - 5 /hpf    Epithelial cells FEW FEW /lpf    Bacteria NEGATIVE  NEG /hpf    UA:UC IF INDICATED CULTURE NOT INDICATED BY UA RESULT CNI      Hyaline cast 2-5 0 - 5 /lpf   DRUG SCREEN, URINE   Result Value Ref Range    AMPHETAMINE NEGATIVE  NEG      BARBITURATES NEGATIVE  NEG      BENZODIAZEPINE NEGATIVE  NEG      COCAINE NEGATIVE  NEG      METHADONE NEGATIVE  NEG      OPIATES NEGATIVE  NEG      PCP(PHENCYCLIDINE) NEGATIVE  NEG      THC (TH-CANNABINOL) NEGATIVE  NEG      Drug screen comment (NOTE)    CBC WITH AUTOMATED DIFF   Result Value Ref Range    WBC 8.0 4.1 - 11.1 K/uL    RBC 4.03 (L) 4.10 - 5.70 M/uL    HGB 12.7 12.1 - 17.0 g/dL    HCT 37.7 36.6 - 50.3 %    MCV 93.5 80.0 - 99.0 FL    MCH 31.5 26.0 - 34.0 PG    MCHC 33.7 30.0 - 36.5 g/dL    RDW 14.5 11.5 - 14.5 %    PLATELET 891 (L) 667 - 400 K/uL    NEUTROPHILS 51 32 - 75 %    LYMPHOCYTES 30 12 - 49 %    MONOCYTES 13 5 - 13 %    EOSINOPHILS 5 0 - 7 %    BASOPHILS 1 0 - 1 %    ABS. NEUTROPHILS 4.1 1.8 - 8.0 K/UL    ABS. LYMPHOCYTES 2.4 0.8 - 3.5 K/UL    ABS. MONOCYTES 1.0 0.0 - 1.0 K/UL    ABS. EOSINOPHILS 0.4 0.0 - 0.4 K/UL    ABS.  BASOPHILS 0.0 0.0 - 0.1 K/UL   METABOLIC PANEL, COMPREHENSIVE   Result Value Ref Range    Sodium 139 136 - 145 mmol/L    Potassium 4.2 3.5 - 5.1 mmol/L    Chloride 105 97 - 108 mmol/L    CO2 26 21 - 32 mmol/L    Anion gap 8 5 - 15 mmol/L    Glucose 125 (H) 65 - 100 mg/dL    BUN 12 6 - 20 MG/DL    Creatinine 1.07 0.70 - 1.30 MG/DL    BUN/Creatinine ratio 11 (L) 12 - 20      GFR est AA >60 >60 ml/min/1.73m2    GFR est non-AA >60 >60 ml/min/1.73m2    Calcium 9.5 8.5 - 10.1 MG/DL    Bilirubin, total 0.5 0.2 - 1.0 MG/DL    ALT (SGPT) 23 12 - 78 U/L    AST (SGOT) 12 (L) 15 - 37 U/L    Alk. phosphatase 58 45 - 117 U/L    Protein, total 6.5 6.4 - 8.2 g/dL    Albumin 2.6 (L) 3.5 - 5.0 g/dL    Globulin 3.9 2.0 - 4.0 g/dL    A-G Ratio 0.7 (L) 1.1 - 2.2     ETHYL ALCOHOL   Result Value Ref Range    ALCOHOL(ETHYL),SERUM <10 <10 MG/DL   VALPROIC ACID   Result Value Ref Range    Valproic acid 92 50 - 100 ug/ml   GLUCOSE, FASTING   Result Value Ref Range    Glucose 77 65 - 100 MG/DL   TSH 3RD GENERATION   Result Value Ref Range    TSH 1.01 0.36 - 3.74 uIU/mL   LIPID PANEL   Result Value Ref Range    LIPID PROFILE          Cholesterol, total 133 <200 MG/DL    Triglyceride 192 (H) <150 MG/DL    HDL Cholesterol 29 MG/DL    LDL, calculated 65.6 0 - 100 MG/DL    VLDL, calculated 38.4 MG/DL    CHOL/HDL Ratio 4.6 0 - 5.0         Immunizations administered during this encounter: There is no immunization history on file for this patient. Screening for Metabolic Disorders for Patients on Antipsychotic Medications    Estimated Body Mass Index  Estimated body mass index is 29.28 kg/(m^2) as calculated from the following:    Height as of this encounter: 5' 8\" (1.727 m). Weight as of this encounter: 87.4 kg (192 lb 9.6 oz).      Vital Signs/Blood Pressure  Visit Vitals    /79 (BP 1 Location: Right arm, BP Patient Position: At rest;Sitting)    Pulse 70    Temp 97.7 °F (36.5 °C)    Resp 16    Ht 5' 8\" (1.727 m)    Wt 87.4 kg (192 lb 9.6 oz)    SpO2 99%    BMI 29.28 kg/m2       Blood Glucose/Hemoglobin A1c  Lab Results   Component Value Date/Time    Glucose 77 2017 06:16 AM        No results found for: HBA1C, HGBE8, RUS5ULOZ     Lipid Panel  Lab Results   Component Value Date/Time    Cholesterol, total 133 2017 06:16 AM    HDL Cholesterol 29 2017 06:16 AM    LDL, calculated 65.6 2017 06:16 AM    Triglyceride 192 2017 06:16 AM    CHOL/HDL Ratio 4.6 2017 06:16 AM            Discharge Diagnosis: Paranoid Schizophrenia     Discharge Plan: He will return to the adult home   DISCHARGE SUMMARY    NAME:Denzel Bradshaw  : 1955  MRN: [id number]    The patient Kelly Wen exhibits the ability to control behavior in a less restrictive environment. Patient's level of functioning is improving. No assaultive/destructive behavior has been observed for the past 24 hours. No suicidal/homicidal threat or behavior has been observed for the past 24 hours. There is no evidence of serious medication side effects. Patient has not been in physical or protective restraints for at least the past 24 hours. If weapons involved, how are they secured? No weapons involved     Is patient aware of and in agreement with discharge plan? Patient and niece are aware of discharge . Arrangements for medication: no prescriptions given to patient. No changes in medications. Copy of discharge instructions to  provider?:  Yes, given to adult home staff     Arrangements for transportation home:  Adult home staff to      Keep all follow up appointments as scheduled, continue to take prescribed medications per physician instructions. Mental health crisis number:  [phone] or your local mental health crisis line number at [phone]         Discharge Medication List and Instructions:   Current Discharge Medication List      CONTINUE these medications which have NOT CHANGED    Details   divalproex ER (DEPAKOTE ER) 500 mg ER tablet Take 1,500 mg by mouth nightly. hydrOXYzine pamoate (VISTARIL) 50 mg capsule Take 25 mg by mouth daily as needed for Anxiety. Indications: URTICARIA      paliperidone (INVEGA) 6 mg SR tablet Take 6 mg by mouth daily. montelukast (SINGULAIR) 10 mg tablet Take 10 mg by mouth daily. fluticasone (FLONASE) 50 mcg/actuation nasal spray 2 Sprays by Both Nostrils route daily. cholecalciferol, vitamin D3, (VITAMIN D3) 2,000 unit tab Take 2,000 Units by mouth. neomycin-polymyxin-hydrocortisone, buffered, (PEDIOTIC) 3.5-10,000-1 mg/mL-unit/mL-% otic suspension Administer 3 Drops in right ear four (4) times daily. albuterol (VENTOLIN HFA) 90 mcg/actuation inhaler Take 2 Puffs by inhalation. triamcinolone acetonide (KENALOG) 0.1 % topical cream Apply  to affected area two (2) times a day. use thin layer  Qty: 15 g, Refills: 0      lisinopril (PRINIVIL, ZESTRIL) 10 mg tablet Take 10 mg by mouth daily. simvastatin (ZOCOR) 10 mg tablet Take 20 mg by mouth nightly.      gabapentin (NEURONTIN) 300 mg capsule Take 300 mg by mouth nightly. sennosides (SENNA) 8.6 mg cap Take 2 Tabs by mouth nightly as needed (Constipation). Unresulted Labs     None        To obtain results of studies pending at discharge, please contact [phone]     Follow-up Information     Follow up With Details Comments Contact Info    Osei Contreras MD   Patient can only remember the practice name and not the physician      Dr. Leandro Cedillo at the Adult home   you will be seen at the adult [place]            Advanced Directive:   Does the patient have an appointed surrogate decision maker? Yes   Niece   Does the patient have a Medical Advance Directive? Yes  Does the patient have a Psychiatric Advance Directive?  No  If the patient does not have a surrogate or Medical Advance Directive AND Psychiatric Advance Directive, the patient was offered information on these advance directives     Patient Instructions: Please continue all medications until otherwise directed by physician. Tobacco Cessation Discharge Plan:   Is the patient a smoker and needs referral for smoking cessation? Yes  Patient referred to the following for smoking cessation with an appointment? Refused     Patient was offered medication to assist with smoking cessation at discharge? Refused  Was education for smoking cessation added to the discharge instructions? Yes    Alcohol/Substance Abuse Discharge Plan:   Does the patient have a history of substance/alcohol abuse and requires a referral for treatment? No  Patient referred to the following for substance/alcohol abuse treatment with an appointment? Not applicable  Patient was offered medication to assist with alcohol cessation at discharge? Not applicable  Was education for substance/alcohol abuse added to discharge instructions? Not applicable    Patient discharged to Home; discussed with patient/caregiver and provided to the patient/caregiver either in hard copy or electronically.

## 2017-06-30 NOTE — PROGRESS NOTES
Problem: Falls - Risk of  Goal: *Absence of falls  Outcome: Progressing Towards Goal  Received patient laying quietly in bed. Respirations are equal and unlabored. Patient in NAD.  Continue to monitor Q 15 minutes and hourly for safety     24 hour chart check complete     Patient has remained free from falls

## 2017-06-30 NOTE — BH NOTES
PRN Medication Documentation  At 6917  Specific patient behavior that led to need for PRN medication: c/o pain 9/10 to back  Staff interventions attempted prior to PRN being given: relaxation  PRN medication given: Motrin 400 mg po prn   Patient response/effectiveness of PRN medication: re-assessed at 0955 and pain 0/10 med effective.

## 2017-06-30 NOTE — BH NOTES
GROUP THERAPY PROGRESS NOTE    [facility] is participating in [facility].      Group time: 15 minutes    Personal goal for participation: n/a    Goal orientation: relaxation    Group therapy participation: active    Therapeutic interventions reviewed and discussed: Review of unit guidelines and using coping skills    Impression of participation: active

## 2017-06-30 NOTE — BH NOTES
Reviewed discharge instructions with patient and caregiver. Opportunity for questions given. Patient took with him, his belongings and discharge instructions. Patient left at 1201. At 1207:    Spoke to [place] at [facility] and explained that patient had nicotine patch on his arm. She verbalized understanding that it will need to be removed should the patient resume smoking again.      The number used to talk to [facility] was [phone]

## 2017-06-30 NOTE — PROGRESS NOTES
Problem: Altered Thought Process (Adult/Pediatric)  Goal: *STG: Remains safe in hospital  Outcome: Progressing Towards Goal  Pt remains safe in hospital on q 15 min safety checks. Goal: *STG: Decreased hallucinations  Outcome: Progressing Towards Goal  Pt denies unmanageable AH. Reports Voices unchanged from start in 1980's. Discuss coping skills and medications. Goal: *LTG: Returns to baseline functioning  Outcome: Resolved/Met Date Met:  06/30/17  Pt goal discharge to adult home today. Discuss with staff and family.

## 2017-07-29 ENCOUNTER — OP HISTORICAL/CONVERTED ENCOUNTER (OUTPATIENT)
Dept: OTHER | Age: 62
End: 2017-07-29

## 2017-08-17 ENCOUNTER — ED HISTORICAL/CONVERTED ENCOUNTER (OUTPATIENT)
Dept: OTHER | Age: 62
End: 2017-08-17

## 2017-08-19 ENCOUNTER — ED HISTORICAL/CONVERTED ENCOUNTER (OUTPATIENT)
Dept: OTHER | Age: 62
End: 2017-08-19

## 2017-08-21 ENCOUNTER — ED HISTORICAL/CONVERTED ENCOUNTER (OUTPATIENT)
Dept: OTHER | Age: 62
End: 2017-08-21

## 2017-08-22 ENCOUNTER — ED HISTORICAL/CONVERTED ENCOUNTER (OUTPATIENT)
Dept: OTHER | Age: 62
End: 2017-08-22

## 2017-08-24 ENCOUNTER — IP HISTORICAL/CONVERTED ENCOUNTER (OUTPATIENT)
Dept: OTHER | Age: 62
End: 2017-08-24